# Patient Record
Sex: MALE | Race: ASIAN | NOT HISPANIC OR LATINO | URBAN - METROPOLITAN AREA
[De-identification: names, ages, dates, MRNs, and addresses within clinical notes are randomized per-mention and may not be internally consistent; named-entity substitution may affect disease eponyms.]

---

## 2021-11-15 ENCOUNTER — EMERGENCY (EMERGENCY)
Facility: HOSPITAL | Age: 61
LOS: 1 days | Discharge: HOME | End: 2021-11-15
Attending: EMERGENCY MEDICINE
Payer: MEDICAID

## 2021-11-15 VITALS
SYSTOLIC BLOOD PRESSURE: 164 MMHG | TEMPERATURE: 98 F | HEIGHT: 68 IN | RESPIRATION RATE: 16 BRPM | HEART RATE: 78 BPM | DIASTOLIC BLOOD PRESSURE: 96 MMHG | WEIGHT: 188.94 LBS | OXYGEN SATURATION: 98 %

## 2021-11-15 VITALS — SYSTOLIC BLOOD PRESSURE: 135 MMHG | DIASTOLIC BLOOD PRESSURE: 78 MMHG

## 2021-11-15 LAB
ALBUMIN SERPL ELPH-MCNC: 4 G/DL — SIGNIFICANT CHANGE UP (ref 3.5–5)
ALP SERPL-CCNC: 100 U/L — SIGNIFICANT CHANGE UP (ref 40–120)
ALT FLD-CCNC: 41 U/L DA — SIGNIFICANT CHANGE UP (ref 10–60)
ANION GAP SERPL CALC-SCNC: 6 MMOL/L — SIGNIFICANT CHANGE UP (ref 5–17)
APPEARANCE UR: CLEAR — SIGNIFICANT CHANGE UP
AST SERPL-CCNC: 14 U/L — SIGNIFICANT CHANGE UP (ref 10–40)
BACTERIA # UR AUTO: ABNORMAL /HPF
BASOPHILS # BLD AUTO: 0.06 K/UL — SIGNIFICANT CHANGE UP (ref 0–0.2)
BASOPHILS NFR BLD AUTO: 0.7 % — SIGNIFICANT CHANGE UP (ref 0–2)
BILIRUB SERPL-MCNC: 0.5 MG/DL — SIGNIFICANT CHANGE UP (ref 0.2–1.2)
BILIRUB UR-MCNC: NEGATIVE — SIGNIFICANT CHANGE UP
BUN SERPL-MCNC: 10 MG/DL — SIGNIFICANT CHANGE UP (ref 7–18)
CALCIUM SERPL-MCNC: 9.3 MG/DL — SIGNIFICANT CHANGE UP (ref 8.4–10.5)
CHLORIDE SERPL-SCNC: 103 MMOL/L — SIGNIFICANT CHANGE UP (ref 96–108)
CO2 SERPL-SCNC: 27 MMOL/L — SIGNIFICANT CHANGE UP (ref 22–31)
COLOR SPEC: YELLOW — SIGNIFICANT CHANGE UP
CREAT SERPL-MCNC: 0.85 MG/DL — SIGNIFICANT CHANGE UP (ref 0.5–1.3)
DIFF PNL FLD: ABNORMAL
EOSINOPHIL # BLD AUTO: 0.18 K/UL — SIGNIFICANT CHANGE UP (ref 0–0.5)
EOSINOPHIL NFR BLD AUTO: 2 % — SIGNIFICANT CHANGE UP (ref 0–6)
EPI CELLS # UR: SIGNIFICANT CHANGE UP /HPF
GLUCOSE SERPL-MCNC: 272 MG/DL — HIGH (ref 70–99)
GLUCOSE UR QL: 1000 MG/DL
HCT VFR BLD CALC: 46.2 % — SIGNIFICANT CHANGE UP (ref 39–50)
HGB BLD-MCNC: 15.7 G/DL — SIGNIFICANT CHANGE UP (ref 13–17)
IMM GRANULOCYTES NFR BLD AUTO: 1.3 % — SIGNIFICANT CHANGE UP (ref 0–1.5)
KETONES UR-MCNC: NEGATIVE — SIGNIFICANT CHANGE UP
LEUKOCYTE ESTERASE UR-ACNC: ABNORMAL
LIDOCAIN IGE QN: 220 U/L — SIGNIFICANT CHANGE UP (ref 73–393)
LYMPHOCYTES # BLD AUTO: 3.19 K/UL — SIGNIFICANT CHANGE UP (ref 1–3.3)
LYMPHOCYTES # BLD AUTO: 35.4 % — SIGNIFICANT CHANGE UP (ref 13–44)
MCHC RBC-ENTMCNC: 26.9 PG — LOW (ref 27–34)
MCHC RBC-ENTMCNC: 34 GM/DL — SIGNIFICANT CHANGE UP (ref 32–36)
MCV RBC AUTO: 79.2 FL — LOW (ref 80–100)
MONOCYTES # BLD AUTO: 0.71 K/UL — SIGNIFICANT CHANGE UP (ref 0–0.9)
MONOCYTES NFR BLD AUTO: 7.9 % — SIGNIFICANT CHANGE UP (ref 2–14)
NEUTROPHILS # BLD AUTO: 4.74 K/UL — SIGNIFICANT CHANGE UP (ref 1.8–7.4)
NEUTROPHILS NFR BLD AUTO: 52.7 % — SIGNIFICANT CHANGE UP (ref 43–77)
NITRITE UR-MCNC: NEGATIVE — SIGNIFICANT CHANGE UP
NRBC # BLD: 0 /100 WBCS — SIGNIFICANT CHANGE UP (ref 0–0)
PH UR: 6.5 — SIGNIFICANT CHANGE UP (ref 5–8)
PLATELET # BLD AUTO: 193 K/UL — SIGNIFICANT CHANGE UP (ref 150–400)
POTASSIUM SERPL-MCNC: 3.5 MMOL/L — SIGNIFICANT CHANGE UP (ref 3.5–5.3)
POTASSIUM SERPL-SCNC: 3.5 MMOL/L — SIGNIFICANT CHANGE UP (ref 3.5–5.3)
PROT SERPL-MCNC: 7.9 G/DL — SIGNIFICANT CHANGE UP (ref 6–8.3)
PROT UR-MCNC: 30 MG/DL
RBC # BLD: 5.83 M/UL — HIGH (ref 4.2–5.8)
RBC # FLD: 12.5 % — SIGNIFICANT CHANGE UP (ref 10.3–14.5)
RBC CASTS # UR COMP ASSIST: ABNORMAL /HPF (ref 0–2)
SODIUM SERPL-SCNC: 136 MMOL/L — SIGNIFICANT CHANGE UP (ref 135–145)
SP GR SPEC: 1.01 — SIGNIFICANT CHANGE UP (ref 1.01–1.02)
UROBILINOGEN FLD QL: NEGATIVE — SIGNIFICANT CHANGE UP
WBC # BLD: 9 K/UL — SIGNIFICANT CHANGE UP (ref 3.8–10.5)
WBC # FLD AUTO: 9 K/UL — SIGNIFICANT CHANGE UP (ref 3.8–10.5)
WBC UR QL: ABNORMAL /HPF (ref 0–5)

## 2021-11-15 PROCEDURE — 99285 EMERGENCY DEPT VISIT HI MDM: CPT

## 2021-11-15 PROCEDURE — 83690 ASSAY OF LIPASE: CPT

## 2021-11-15 PROCEDURE — 74176 CT ABD & PELVIS W/O CONTRAST: CPT | Mod: MA

## 2021-11-15 PROCEDURE — 85025 COMPLETE CBC W/AUTO DIFF WBC: CPT

## 2021-11-15 PROCEDURE — 87086 URINE CULTURE/COLONY COUNT: CPT

## 2021-11-15 PROCEDURE — 74176 CT ABD & PELVIS W/O CONTRAST: CPT | Mod: 26,MA

## 2021-11-15 PROCEDURE — 36415 COLL VENOUS BLD VENIPUNCTURE: CPT

## 2021-11-15 PROCEDURE — 96374 THER/PROPH/DIAG INJ IV PUSH: CPT

## 2021-11-15 PROCEDURE — 80053 COMPREHEN METABOLIC PANEL: CPT

## 2021-11-15 PROCEDURE — 99284 EMERGENCY DEPT VISIT MOD MDM: CPT | Mod: 25

## 2021-11-15 PROCEDURE — 81001 URINALYSIS AUTO W/SCOPE: CPT

## 2021-11-15 RX ORDER — KETOROLAC TROMETHAMINE 30 MG/ML
15 SYRINGE (ML) INJECTION ONCE
Refills: 0 | Status: DISCONTINUED | OUTPATIENT
Start: 2021-11-15 | End: 2021-11-15

## 2021-11-15 RX ORDER — TAMSULOSIN HYDROCHLORIDE 0.4 MG/1
1 CAPSULE ORAL
Qty: 7 | Refills: 0
Start: 2021-11-15 | End: 2021-11-21

## 2021-11-15 RX ORDER — METHOCARBAMOL 500 MG/1
1 TABLET, FILM COATED ORAL
Qty: 12 | Refills: 0
Start: 2021-11-15 | End: 2021-11-18

## 2021-11-15 RX ORDER — IBUPROFEN 200 MG
1 TABLET ORAL
Qty: 20 | Refills: 0
Start: 2021-11-15 | End: 2021-11-19

## 2021-11-15 RX ORDER — SODIUM CHLORIDE 9 MG/ML
1000 INJECTION INTRAMUSCULAR; INTRAVENOUS; SUBCUTANEOUS ONCE
Refills: 0 | Status: COMPLETED | OUTPATIENT
Start: 2021-11-15 | End: 2021-11-15

## 2021-11-15 RX ORDER — LIDOCAINE 4 G/100G
1 CREAM TOPICAL ONCE
Refills: 0 | Status: COMPLETED | OUTPATIENT
Start: 2021-11-15 | End: 2021-11-15

## 2021-11-15 RX ORDER — LIDOCAINE 4 G/100G
1 CREAM TOPICAL
Qty: 5 | Refills: 0
Start: 2021-11-15 | End: 2021-11-19

## 2021-11-15 RX ADMIN — LIDOCAINE 1 PATCH: 4 CREAM TOPICAL at 16:59

## 2021-11-15 RX ADMIN — Medication 15 MILLIGRAM(S): at 15:01

## 2021-11-15 RX ADMIN — SODIUM CHLORIDE 1000 MILLILITER(S): 9 INJECTION INTRAMUSCULAR; INTRAVENOUS; SUBCUTANEOUS at 15:01

## 2021-11-15 NOTE — ED PROVIDER NOTE - PATIENT PORTAL LINK FT
You can access the FollowMyHealth Patient Portal offered by [facility] by registering at the following website: [email or web address]. By joining [employer]’s FollowMyHealth portal, you will also be able to view your health information using other applications (apps) compatible with our system.

## 2021-11-15 NOTE — ED PROVIDER NOTE - PROGRESS NOTE DETAILS
On re-exam, feeling much better. Still has some R flank/lower back pain. Has R paraspinal L2-L5 tenderness to palpation. R side back pain most likely due to muscle strain. CT shows L sided 4 mm mid-ureteral stone. No hydronephrosis or fat stranding. UA unlikely UTI especially that recently finished Bactrim x 1 week. Will dc with Rx for meds and urology follow up this week. Pt is well appearing walking with steady gait, stable for discharge and follow up without fail with medical doctor. I had a detailed discussion with the patient and/or guardian regarding the historical points, exam findings, and any diagnostic results supporting the discharge diagnosis. Pt educated on care and need for follow up. Strict return instructions and red flag signs and symptoms discussed with patient. Questions answered. Pt shows understanding of discharge information and agrees to follow.

## 2021-11-15 NOTE — ED PROVIDER NOTE - NSFOLLOWUPINSTRUCTIONS_ED_ALL_ED_FT
Follow up with the urologist this week without fail.  If you experience any new or worsening symptoms or if you are concerned you can always come back to the emergency for a re-evaluation.  If there were any prescriptions given to you during the visit today take them as prescribed. If you have any questions you can ask the pharmacist.

## 2021-11-15 NOTE — ED PROVIDER NOTE - OBJECTIVE STATEMENT
61 year old male with PMHx of HTN, diabetes and renal stones and no significant PSHx presenting to the ED with complaints of flank pain x3 weeks. Patient reports having gradual onset of left flank pain since approximately three weeks ago which intermittently radiates to the LLQ. Patient reports right flank pain for two weeks which he describes as an intermittent sharp sensation which occasionally radiates to the RLQ. Patient endorses that he was evaluated at [facility] on 10/25, and reports that a CT scan was performed at the time which revealed a 7.0 mm stone on the left side. Patient states that he was given Bactrim, Flomax, and Percocet. Patient is now presenting to the ED as he is unable to control the pain anymore. Patient denies any fever, chills, vomiting, testicular pain, inability to urinate, and all other acute complaints. NKDA.

## 2021-11-15 NOTE — ED PROVIDER NOTE - CARE PLAN
1 Principal Discharge DX:	Renal colic on left side  Secondary Diagnosis:	Strain of thoracic spine, initial encounter

## 2021-11-15 NOTE — ED ADULT NURSE NOTE - OBJECTIVE STATEMENT
pt is a 62 y/o male w/ c/o rt  flank pain pt no signs of any  distress nor discomfort w/ VSS.denies any  blood in the urine and pt  no signs of any  distress nor discomfort.

## 2021-11-15 NOTE — ED PROVIDER NOTE - NSICDXPASTMEDICALHX_GEN_ALL_CORE_FT
PAST MEDICAL HISTORY:  No pertinent past medical history       DM (diabetes mellitus)     HTN (hypertension)     Kidney stones

## 2021-11-15 NOTE — ED ADULT NURSE NOTE - NSIMPLEMENTINTERV_GEN_ALL_ED
IMPROVE VTE Individual Risk Assessment          RISK                                                          Points  [ x ] Previous VTE                                                3  [  ] Thrombophilia                                             2  [  ] Lower limb paralysis                                   2        (unable to hold up >15 seconds)    [  ] Current Cancer                                             2         (within 6 months)  [x  ] Immobilization > 24 hrs                              1  [  ] ICU/CCU stay > 24 hours                             1  [  x] Age > 60                                                         1    IMPROVE VTE Score: 5    c/w heparin drip for DVT/PE
Implemented All Universal Safety Interventions:  [place] to call system. Call bell, personal items and telephone within reach. Instruct patient to call for assistance. Room bathroom lighting operational. Non-slip footwear when patient is off stretcher. Physically safe environment: no spills, clutter or unnecessary equipment. Stretcher in lowest position, wheels locked, appropriate side rails in place.

## 2021-11-15 NOTE — ED PROVIDER NOTE - ATTENDING CONTRIBUTION TO CARE
61 year old male presents to the ED with complaints of bilateral flank pain over the past few weeks. Patient nontoxic appearing, hypertensive in triage, and afebrile. No signs of acute abdomen. Will obtain CT scan to rule out renal stone, check labs and urine, give IV fluids and meds, and reassess

## 2021-11-15 NOTE — ED PROVIDER NOTE - CLINICAL SUMMARY MEDICAL DECISION MAKING FREE TEXT BOX
61 year old male presents to the ED with complaints of bilateral flank pain over the past few weeks. Patient nontoxic appearing, hypertensive in triage, and afebrile. No signs of acute abdomen. Will obtain CT scan to rule out renal stone, check labs and urine, give IV fluids and meds, and reassess. Patient states that he never followed up with urologist since discharge.

## 2021-11-16 LAB
CULTURE RESULTS: SIGNIFICANT CHANGE UP
SPECIMEN SOURCE: SIGNIFICANT CHANGE UP

## 2022-06-07 ENCOUNTER — EMERGENCY (EMERGENCY)
Facility: HOSPITAL | Age: 62
LOS: 1 days | Discharge: HOME | End: 2022-06-07
Attending: EMERGENCY MEDICINE
Payer: MEDICAID

## 2022-06-07 VITALS
TEMPERATURE: 98 F | WEIGHT: 195.99 LBS | HEIGHT: 68 IN | HEART RATE: 79 BPM | RESPIRATION RATE: 18 BRPM | OXYGEN SATURATION: 97 % | DIASTOLIC BLOOD PRESSURE: 91 MMHG | SYSTOLIC BLOOD PRESSURE: 187 MMHG

## 2022-06-07 VITALS — DIASTOLIC BLOOD PRESSURE: 94 MMHG | SYSTOLIC BLOOD PRESSURE: 169 MMHG | HEART RATE: 64 BPM

## 2022-06-07 PROBLEM — I10 ESSENTIAL (PRIMARY) HYPERTENSION: Chronic | Status: ACTIVE | Noted: 2021-11-15

## 2022-06-07 LAB
ALBUMIN SERPL ELPH-MCNC: 3.2 G/DL — LOW (ref 3.5–5)
ALP SERPL-CCNC: 90 U/L — SIGNIFICANT CHANGE UP (ref 40–120)
ALT FLD-CCNC: 29 U/L DA — SIGNIFICANT CHANGE UP (ref 10–60)
ANION GAP SERPL CALC-SCNC: 7 MMOL/L — SIGNIFICANT CHANGE UP (ref 5–17)
APPEARANCE UR: CLEAR — SIGNIFICANT CHANGE UP
AST SERPL-CCNC: 10 U/L — SIGNIFICANT CHANGE UP (ref 10–40)
BASOPHILS # BLD AUTO: 0.07 K/UL — SIGNIFICANT CHANGE UP (ref 0–0.2)
BASOPHILS NFR BLD AUTO: 0.8 % — SIGNIFICANT CHANGE UP (ref 0–2)
BILIRUB SERPL-MCNC: 0.5 MG/DL — SIGNIFICANT CHANGE UP (ref 0.2–1.2)
BILIRUB UR-MCNC: NEGATIVE — SIGNIFICANT CHANGE UP
BUN SERPL-MCNC: 15 MG/DL — SIGNIFICANT CHANGE UP (ref 7–18)
CALCIUM SERPL-MCNC: 9.1 MG/DL — SIGNIFICANT CHANGE UP (ref 8.4–10.5)
CHLORIDE SERPL-SCNC: 104 MMOL/L — SIGNIFICANT CHANGE UP (ref 96–108)
CO2 SERPL-SCNC: 27 MMOL/L — SIGNIFICANT CHANGE UP (ref 22–31)
COLOR SPEC: YELLOW — SIGNIFICANT CHANGE UP
CREAT SERPL-MCNC: 1.03 MG/DL — SIGNIFICANT CHANGE UP (ref 0.5–1.3)
DIFF PNL FLD: ABNORMAL
EGFR: 83 ML/MIN/1.73M2 — SIGNIFICANT CHANGE UP
EOSINOPHIL # BLD AUTO: 0.25 K/UL — SIGNIFICANT CHANGE UP (ref 0–0.5)
EOSINOPHIL NFR BLD AUTO: 2.8 % — SIGNIFICANT CHANGE UP (ref 0–6)
GLUCOSE SERPL-MCNC: 293 MG/DL — HIGH (ref 70–99)
GLUCOSE UR QL: 1000 MG/DL
HCT VFR BLD CALC: 41.6 % — SIGNIFICANT CHANGE UP (ref 39–50)
HGB BLD-MCNC: 14.2 G/DL — SIGNIFICANT CHANGE UP (ref 13–17)
IMM GRANULOCYTES NFR BLD AUTO: 0.7 % — SIGNIFICANT CHANGE UP (ref 0–1.5)
KETONES UR-MCNC: NEGATIVE — SIGNIFICANT CHANGE UP
LEUKOCYTE ESTERASE UR-ACNC: NEGATIVE — SIGNIFICANT CHANGE UP
LIDOCAIN IGE QN: 188 U/L — SIGNIFICANT CHANGE UP (ref 73–393)
LYMPHOCYTES # BLD AUTO: 3.11 K/UL — SIGNIFICANT CHANGE UP (ref 1–3.3)
LYMPHOCYTES # BLD AUTO: 35.1 % — SIGNIFICANT CHANGE UP (ref 13–44)
MAGNESIUM SERPL-MCNC: 1.9 MG/DL — SIGNIFICANT CHANGE UP (ref 1.6–2.6)
MCHC RBC-ENTMCNC: 26.6 PG — LOW (ref 27–34)
MCHC RBC-ENTMCNC: 34.1 GM/DL — SIGNIFICANT CHANGE UP (ref 32–36)
MCV RBC AUTO: 78 FL — LOW (ref 80–100)
MONOCYTES # BLD AUTO: 0.75 K/UL — SIGNIFICANT CHANGE UP (ref 0–0.9)
MONOCYTES NFR BLD AUTO: 8.5 % — SIGNIFICANT CHANGE UP (ref 2–14)
NEUTROPHILS # BLD AUTO: 4.61 K/UL — SIGNIFICANT CHANGE UP (ref 1.8–7.4)
NEUTROPHILS NFR BLD AUTO: 52.1 % — SIGNIFICANT CHANGE UP (ref 43–77)
NITRITE UR-MCNC: NEGATIVE — SIGNIFICANT CHANGE UP
NRBC # BLD: 0 /100 WBCS — SIGNIFICANT CHANGE UP (ref 0–0)
PH UR: 6 — SIGNIFICANT CHANGE UP (ref 5–8)
PLATELET # BLD AUTO: 179 K/UL — SIGNIFICANT CHANGE UP (ref 150–400)
POTASSIUM SERPL-MCNC: 3.4 MMOL/L — LOW (ref 3.5–5.3)
POTASSIUM SERPL-SCNC: 3.4 MMOL/L — LOW (ref 3.5–5.3)
PROT SERPL-MCNC: 7.1 G/DL — SIGNIFICANT CHANGE UP (ref 6–8.3)
PROT UR-MCNC: 30 MG/DL
RBC # BLD: 5.33 M/UL — SIGNIFICANT CHANGE UP (ref 4.2–5.8)
RBC # FLD: 13 % — SIGNIFICANT CHANGE UP (ref 10.3–14.5)
SODIUM SERPL-SCNC: 138 MMOL/L — SIGNIFICANT CHANGE UP (ref 135–145)
SP GR SPEC: 1.01 — SIGNIFICANT CHANGE UP (ref 1.01–1.02)
TROPONIN I, HIGH SENSITIVITY RESULT: 13.2 NG/L — SIGNIFICANT CHANGE UP
UROBILINOGEN FLD QL: NEGATIVE — SIGNIFICANT CHANGE UP
WBC # BLD: 8.85 K/UL — SIGNIFICANT CHANGE UP (ref 3.8–10.5)
WBC # FLD AUTO: 8.85 K/UL — SIGNIFICANT CHANGE UP (ref 3.8–10.5)

## 2022-06-07 PROCEDURE — 87086 URINE CULTURE/COLONY COUNT: CPT

## 2022-06-07 PROCEDURE — 71046 X-RAY EXAM CHEST 2 VIEWS: CPT

## 2022-06-07 PROCEDURE — 93005 ELECTROCARDIOGRAM TRACING: CPT

## 2022-06-07 PROCEDURE — 83690 ASSAY OF LIPASE: CPT

## 2022-06-07 PROCEDURE — 99285 EMERGENCY DEPT VISIT HI MDM: CPT | Mod: 25

## 2022-06-07 PROCEDURE — 74176 CT ABD & PELVIS W/O CONTRAST: CPT | Mod: MA

## 2022-06-07 PROCEDURE — 99285 EMERGENCY DEPT VISIT HI MDM: CPT

## 2022-06-07 PROCEDURE — 81001 URINALYSIS AUTO W/SCOPE: CPT

## 2022-06-07 PROCEDURE — 96374 THER/PROPH/DIAG INJ IV PUSH: CPT

## 2022-06-07 PROCEDURE — 84484 ASSAY OF TROPONIN QUANT: CPT

## 2022-06-07 PROCEDURE — 74176 CT ABD & PELVIS W/O CONTRAST: CPT | Mod: 26,MA

## 2022-06-07 PROCEDURE — 85025 COMPLETE CBC W/AUTO DIFF WBC: CPT

## 2022-06-07 PROCEDURE — 83735 ASSAY OF MAGNESIUM: CPT

## 2022-06-07 PROCEDURE — 80053 COMPREHEN METABOLIC PANEL: CPT

## 2022-06-07 PROCEDURE — 71046 X-RAY EXAM CHEST 2 VIEWS: CPT | Mod: 26

## 2022-06-07 PROCEDURE — 36415 COLL VENOUS BLD VENIPUNCTURE: CPT

## 2022-06-07 RX ORDER — KETOROLAC TROMETHAMINE 30 MG/ML
15 SYRINGE (ML) INJECTION ONCE
Refills: 0 | Status: DISCONTINUED | OUTPATIENT
Start: 2022-06-07 | End: 2022-06-07

## 2022-06-07 RX ORDER — TAMSULOSIN HYDROCHLORIDE 0.4 MG/1
1 CAPSULE ORAL
Qty: 14 | Refills: 0
Start: 2022-06-07 | End: 2022-06-20

## 2022-06-07 RX ORDER — KETOROLAC TROMETHAMINE 30 MG/ML
1 SYRINGE (ML) INJECTION
Qty: 20 | Refills: 0
Start: 2022-06-07

## 2022-06-07 RX ORDER — SODIUM CHLORIDE 9 MG/ML
1000 INJECTION INTRAMUSCULAR; INTRAVENOUS; SUBCUTANEOUS ONCE
Refills: 0 | Status: COMPLETED | OUTPATIENT
Start: 2022-06-07 | End: 2022-06-07

## 2022-06-07 RX ADMIN — SODIUM CHLORIDE 1000 MILLILITER(S): 9 INJECTION INTRAMUSCULAR; INTRAVENOUS; SUBCUTANEOUS at 14:02

## 2022-06-07 RX ADMIN — Medication 15 MILLIGRAM(S): at 14:01

## 2022-06-07 NOTE — ED ADULT NURSE NOTE - OBJECTIVE STATEMENT
Pt. c/o left flank pain that started a week ago. Pt. denies n/v/d and  symptoms. Pt. is hypertensive at triage but states "my blood pressure is always high".

## 2022-06-07 NOTE — ED PROVIDER NOTE - OBJECTIVE STATEMENT
62yoM with h/o HTN, DM, kidney stone s/p laser in Feb, h/o cervical and lumbar disc problems with baseline L finger numbness and L leg weakness 2/2 this, presents with L sided flank pain x 1 wk, that radiates sometimes to the R flank and sometimes very lightly to the L front abdomen, intermittent, nonexertional, improved with Tyl and naproxen. Denies all other symptoms including CP, SOB, cough, urinary symptoms, increased weakness/numbness of extrems, v/d, fever.

## 2022-06-07 NOTE — ED PROVIDER NOTE - CLINICAL SUMMARY MEDICAL DECISION MAKING FREE TEXT BOX
Character and appearance very low suspicion for dissection and no murmur or pulse asymmetry. Character low suspicion for ACS or PE and ECG/trop _____________. Character and appearance very low suspicion for dissection and no murmur or pulse asymmetry. Character low suspicion for ACS or PE and ECG/trop low suspicion for this. Character c/w pt's past h/o kidney stones, confirmed on CT. No RHONA or UTI. Given fluids, Toradol with significant improvement. Patient is well appearing, NAD, afebrile, hemodynamically stable. Any available tests and studies were discussed with patient. Discharged with instructions in further symptomatic care, return precautions, and need for uro f/u.

## 2022-06-07 NOTE — ED PROVIDER NOTE - NSFOLLOWUPINSTRUCTIONS_ED_ALL_ED_FT
Please follow up with a urologist in 1-2 days.  Please drink a lot of fluids to keep hydrated.  Please use ketorolac as needed for pain.  Please use the tamsulosin as prescribed.  Please return to the emergency department if you have worsening pain, vomiting, fever, or any other symptoms.      Kidney Stones    WHAT YOU NEED TO KNOW:    Kidney stones form in the urinary system when the water and waste in your urine are out of balance. When this happens, certain types of waste crystals separate from the urine. The crystals build up and form kidney stones. You may have more than one kidney stone.    DISCHARGE INSTRUCTIONS:    Return to the emergency department if:   •You are vomiting and it is not relieved with medicine.    Call your doctor or kidney specialist if:   •You have a fever.  •You have trouble urinating.  •You see blood in your urine.  •You have severe pain.  •You have any questions or concerns about your condition or care.    Medicines: You may need any of the following:  •NSAIDs, such as ibuprofen, help decrease swelling, pain, and fever. This medicine is available with or without a doctor's order. NSAIDs can cause stomach bleeding or kidney problems in certain people. If you take blood thinner medicine, always ask your healthcare provider if NSAIDs are safe for you. Always read the medicine label and follow directions.  •Acetaminophen decreases pain and fever. It is available without a doctor's order. Ask how much to take and how often to take it. Follow directions. Read the labels of all other medicines you are using to see if they also contain acetaminophen, or ask your doctor or pharmacist. Acetaminophen can cause liver damage if not taken correctly. Do not use more than 4 grams (4,000 milligrams) total of acetaminophen in one day.   •Prescription pain medicine may be given. Ask your healthcare provider how to take this medicine safely. Some prescription pain medicines contain acetaminophen. Do not take other medicines that contain acetaminophen without talking to your healthcare provider. Too much acetaminophen may cause liver damage. Prescription pain medicine may cause constipation. Ask your healthcare provider how to prevent or treat constipation.   •Medicines to balance your electrolytes may be needed.  •Take your medicine as directed. Contact your healthcare provider if you think your medicine is not helping or if you have side effects. Tell him or her if you are allergic to any medicine. Keep a list of the medicines, vitamins, and herbs you take. Include the amounts, and when and why you take them. Bring the list or the pill bottles to follow-up visits. Carry your medicine list with you in case of an emergency.    What you can do to manage kidney stones:   •Drink more liquids. Your healthcare provider may tell you to drink at least 8 to 12 (eight-ounce) cups of liquids each day. This helps flush out the kidney stones when you urinate. Water is the best liquid to drink.  •Strain your urine every time you go to the bathroom. Urinate through a strainer or a piece of thin cloth to catch the stones. Take the stones to your healthcare provider so they can be sent to the lab for tests. This will help your healthcare providers plan the best treatment for you.  •Eat a variety of healthy foods. Healthy foods include fruits, vegetables, whole-grain breads, low-fat dairy products, beans, and fish. You may need to limit how much sodium (salt) or protein you eat. Ask for information about the best foods for you.  •Be physically active as directed. Your stones may pass more easily if you stay active. Physical activity can also help you manage your weight. Ask about the best activities for you.    After you pass the kidney stones: Your healthcare provider may order a 24-hour urine test. Results from a 24-hour urine test will help your healthcare provider plan ways to prevent more stones from forming. Your healthcare provider will give you more instructions.    Follow up with your doctor or kidney specialist as directed: Write down your questions so you remember to ask them during your visits.

## 2022-06-07 NOTE — ED PROVIDER NOTE - NSICDXPASTMEDICALHX_GEN_ALL_CORE_FT
PAST MEDICAL HISTORY:  DM (diabetes mellitus)     HTN (hypertension)     Kidney stones     No pertinent past medical history

## 2022-06-07 NOTE — ED PROVIDER NOTE - CARE PROVIDER_API CALL
Cristino Kennedy (MD)  Urology  [address]  [place] [address]  Phone: [phone]  Fax: [phone]  Follow Up Time: 1-3 Days

## 2022-06-07 NOTE — ED PROVIDER NOTE - PATIENT PORTAL LINK FT
You can access the FollowMyHealth Patient Portal offered by [facility] by registering at the following website: [email or web address]. By joining [employer]’s FollowMyHealth portal, you will also be able to view your health information using other applications (apps) compatible with our system.

## 2022-06-07 NOTE — ED PROVIDER NOTE - PHYSICAL EXAMINATION
Afebrile, hemodynamically stable, saturating well  NAD, well appearing, sitting comfortably in bed, no WOB, speaking full sentences  Head NCAT  EOMI grossly, anicteric  MMM  No JVD  RRR, nml S1/S2, no m/r/g  Lungs CTAB, no w/r/r  Abd soft, NT, ND, nml BS, no rebound or guarding, no CVAT  AAO, CN's 3-12 intact, reports decreased sensation to L arm (baseline) though not to leg  SCOTT spontaneously, no leg cyanosis or edema, 2+ DP and radial pulses  Skin warm, dry, no rashes or hives

## 2022-06-08 LAB
CULTURE RESULTS: SIGNIFICANT CHANGE UP
SPECIMEN SOURCE: SIGNIFICANT CHANGE UP

## 2022-06-17 ENCOUNTER — APPOINTMENT (OUTPATIENT)
Dept: UROLOGY | Facility: CLINIC | Age: 62
End: 2022-06-17

## 2022-06-21 ENCOUNTER — APPOINTMENT (OUTPATIENT)
Dept: UROLOGY | Facility: CLINIC | Age: 62
End: 2022-06-21
Payer: COMMERCIAL

## 2022-06-21 VITALS
HEIGHT: 68 IN | DIASTOLIC BLOOD PRESSURE: 69 MMHG | SYSTOLIC BLOOD PRESSURE: 161 MMHG | TEMPERATURE: 97.1 F | HEART RATE: 96 BPM | WEIGHT: 203 LBS | RESPIRATION RATE: 16 BRPM | OXYGEN SATURATION: 96 % | BODY MASS INDEX: 30.77 KG/M2

## 2022-06-21 DIAGNOSIS — Z78.9 OTHER SPECIFIED HEALTH STATUS: ICD-10-CM

## 2022-06-21 PROCEDURE — 99072 ADDL SUPL MATRL&STAF TM PHE: CPT

## 2022-06-21 PROCEDURE — 99204 OFFICE O/P NEW MOD 45 MIN: CPT

## 2022-06-22 NOTE — ADDENDUM
[FreeTextEntry1] : Entered by Candelaria Guerra, acting as scribe for Dr. Mario Hooker.\par \par The documentation recorded by the scribe accurately reflects the service I personally performed and the decisions made by me.

## 2022-06-22 NOTE — LETTER BODY
[FreeTextEntry1] : Alanis Rowland MD \par [address] [facility], \[place] [address]\par [phone]\par \par Dear Dr. Rowland, \par \par Reason for Visit: Left ureteral stone. Renal colic\par \par This is a 62 year-old gentleman with left renal colic and left ureteral stone. The patient is referred for evaluation of his condition. He previously underwent left ureteroscopy in February 2022. The patient recently went to the emergency department for left renal colic. His hospital CT scan demonstrated a 4 mm left ureteral stone and moderate left hydroureteronephrosis. The patient denies any gross hematuria or dysuria or urinary incontinence. The patient denies any aggravating or relieving factors. The patient denies any interference of function. All other review of systems are negative. Past medical history, family history and social history were inquired and were noncontributory to current condition. The patient does not use tobacco or drink alcohol. Medications and allergies were reviewed. He has no known allergies to medication. \par \par On examination, the patient is in no acute distress. He is alert and oriented follows commands. He has normal mood and affect. He is normocephalic. Oral no thrush. Neck is supple. Respirations are unlabored. His abdomen is soft and nontender. Liver is nonpalpable. Bladder is nonpalpable. No CVA tenderness. Neurologically he is grossly intact. No peripheral edema. Skin without gross abnormality.  \par \par I personally reviewed CT images with the patient today and images demonstrated a 4 mm left ureteral stone and moderate left hydroureteronephrosis. \par \par Assessment: Left renal colic. Left ureteral stone. \par \par I counseled the patient. I discussed the various etiologies of his symptoms. I recommended he patient undergo repeat left ureteroscopy, laser lithotripsy, stone extraction, and stent placement. I counseled the patient regarding the procedure. The risks and benefits were discussed. Alternatives were given. I answered the patient questions. The patient will take the necessary preparations for the procedure. He will obtain preoperative labs including BMP, CBC w/ DIFF, culture and COVID testing. I also recommended the patient begin a trial of Flomax as stone expulsion therapy. He will take Percocet PRN for pain. I discussed the potential side effects of the medication. I counseled the patient on its use and side effects. If the patient develops any side effects, the patient will discontinue the medication and contact me. The patient will follow-up as directed and will contact me with any questions or concerns. Thank you for the opportunity to participate in the care of this patient. I'll keep you updated on his progress.\par \par Plan: Left ureteroscopy, laser lithotripsy, stone extraction, and stent placement. Preoperative labs. Trial of Flomax. Percocet PRN. Follow up as directed. \par \par

## 2022-06-23 LAB
ANION GAP SERPL CALC-SCNC: 13 MMOL/L
BACTERIA UR CULT: NORMAL
BASOPHILS # BLD AUTO: 0.05 K/UL
BASOPHILS NFR BLD AUTO: 0.5 %
BUN SERPL-MCNC: 9 MG/DL
CALCIUM SERPL-MCNC: 8.9 MG/DL
CHLORIDE SERPL-SCNC: 99 MMOL/L
CO2 SERPL-SCNC: 23 MMOL/L
CREAT SERPL-MCNC: 1.11 MG/DL
EGFR: 76 ML/MIN/1.73M2
EOSINOPHIL # BLD AUTO: 0.27 K/UL
EOSINOPHIL NFR BLD AUTO: 3 %
GLUCOSE SERPL-MCNC: 373 MG/DL
HCT VFR BLD CALC: 41.3 %
HGB BLD-MCNC: 13.8 G/DL
IMM GRANULOCYTES NFR BLD AUTO: 0.5 %
LYMPHOCYTES # BLD AUTO: 2.47 K/UL
LYMPHOCYTES NFR BLD AUTO: 27.1 %
MAN DIFF?: NORMAL
MCHC RBC-ENTMCNC: 27.5 PG
MCHC RBC-ENTMCNC: 33.4 GM/DL
MCV RBC AUTO: 82.4 FL
MONOCYTES # BLD AUTO: 0.64 K/UL
MONOCYTES NFR BLD AUTO: 7 %
NEUTROPHILS # BLD AUTO: 5.64 K/UL
NEUTROPHILS NFR BLD AUTO: 61.9 %
PLATELET # BLD AUTO: 189 K/UL
POTASSIUM SERPL-SCNC: 3.4 MMOL/L
RBC # BLD: 5.01 M/UL
RBC # FLD: 13.2 %
SODIUM SERPL-SCNC: 135 MMOL/L
WBC # FLD AUTO: 9.12 K/UL

## 2022-06-29 ENCOUNTER — OUTPATIENT (OUTPATIENT)
Dept: OUTPATIENT SERVICES | Facility: HOSPITAL | Age: 62
LOS: 1 days | End: 2022-06-29
Payer: MEDICAID

## 2022-06-29 VITALS
RESPIRATION RATE: 18 BRPM | HEART RATE: 78 BPM | WEIGHT: 197.98 LBS | TEMPERATURE: 98 F | OXYGEN SATURATION: 98 % | DIASTOLIC BLOOD PRESSURE: 75 MMHG | HEIGHT: 68 IN | SYSTOLIC BLOOD PRESSURE: 160 MMHG

## 2022-06-29 DIAGNOSIS — Z01.818 ENCOUNTER FOR OTHER PREPROCEDURAL EXAMINATION: ICD-10-CM

## 2022-06-29 DIAGNOSIS — N20.1 CALCULUS OF URETER: ICD-10-CM

## 2022-06-29 DIAGNOSIS — E11.9 TYPE 2 DIABETES MELLITUS WITHOUT COMPLICATIONS: ICD-10-CM

## 2022-06-29 DIAGNOSIS — I10 ESSENTIAL (PRIMARY) HYPERTENSION: ICD-10-CM

## 2022-06-29 DIAGNOSIS — Z98.890 OTHER SPECIFIED POSTPROCEDURAL STATES: Chronic | ICD-10-CM

## 2022-06-29 DIAGNOSIS — Z00.00 ENCOUNTER FOR GENERAL ADULT MEDICAL EXAMINATION WITHOUT ABNORMAL FINDINGS: ICD-10-CM

## 2022-06-29 PROCEDURE — G0463: CPT

## 2022-06-29 PROCEDURE — 87086 URINE CULTURE/COLONY COUNT: CPT

## 2022-06-29 PROCEDURE — 83036 HEMOGLOBIN GLYCOSYLATED A1C: CPT

## 2022-06-29 NOTE — H&P PST ADULT - FALL HARM RISK - UNIVERSAL INTERVENTIONS
Bed in lowest position, wheels locked, appropriate side rails in place/Call bell, personal items and telephone in reach/Instruct patient to call for assistance before getting out of bed or chair/Non-slip footwear when patient is out of bed/[place] to call system/Physically safe environment - no spills, clutter or unnecessary equipment/Purposeful Proactive Rounding/Room/bathroom lighting operational, light cord in reach

## 2022-06-29 NOTE — H&P PST ADULT - ATTENDING COMMENTS
Patient with left ureteral calculus. Patient wishes to proceed with left ureteroscopy, laser lithotripsy, stone extraction, stent placement. Informed consent was obtained. Alternatives were given. Risks including but not limited to bleeding, infection, risk of anesthesia, pain, failure to cure, negative ureteroscopy, stone migration, need for future procedure, and injury to surrounding structures were discussed. Patient wishes to proceed with procedure. Patient with left ureteral calculus.  Patient wishes to proceed with left ureteroscopy, laser lithotripsy, stone extraction, stent placement. Informed consent was obtained. Alternatives were given. Risks including but not limited to bleeding, infection, risk of anesthesia, pain, failure to cure, negative ureteroscopy, stone migration, need for future procedure, and injury to surrounding structures were discussed. Patient wishes to proceed with procedure.

## 2022-06-29 NOTE — H&P PST ADULT - HISTORY OF PRESENT ILLNESS
62yr old male with hx of kidney stones. Pt states pain started on left side 10/2021 went to ED found to have 7mm stone. Laser lithotripsy was done 2/2022 without stent placement. Pain reoccurred on left side CT scan showed stone. Now coming in for  Left ureteroscopy laser lithotripsy stone extraction stent placement. Hx sig for HTN Diabetes HLD obesity BMI 30.1. Pt getting medical and cardio eval.    Note; covid test 7/13/2022 [place]

## 2022-06-29 NOTE — H&P PST ADULT - NSICDXPASTMEDICALHX_GEN_ALL_CORE_FT
PAST MEDICAL HISTORY:  COVID-19 12/2020 Fever 14days chills bodyaches no hosp mild asthma    DM (diabetes mellitus) 2007    History of hepatitis A 6/2020    HTN (hypertension)     Hyperlipidemia     Hypothyroidism     Kidney stones 2004 right 2021 left    Obesity (BMI 30.0-34.9)

## 2022-07-01 PROBLEM — E66.9 OBESITY, UNSPECIFIED: Chronic | Status: ACTIVE | Noted: 2022-06-29

## 2022-07-01 PROBLEM — N20.0 CALCULUS OF KIDNEY: Chronic | Status: ACTIVE | Noted: 2021-11-15

## 2022-07-01 PROBLEM — E78.5 HYPERLIPIDEMIA, UNSPECIFIED: Chronic | Status: ACTIVE | Noted: 2022-06-29

## 2022-07-01 PROBLEM — E03.9 HYPOTHYROIDISM, UNSPECIFIED: Chronic | Status: ACTIVE | Noted: 2022-06-29

## 2022-07-01 RX ORDER — OXYCODONE AND ACETAMINOPHEN 5; 325 MG/1; MG/1
5-325 TABLET ORAL
Qty: 9 | Refills: 0 | Status: ACTIVE | COMMUNITY
Start: 2022-06-21 | End: 1900-01-01

## 2022-07-03 ENCOUNTER — OUTPATIENT (OUTPATIENT)
Dept: OUTPATIENT SERVICES | Facility: HOSPITAL | Age: 62
LOS: 1 days | End: 2022-07-03
Payer: MEDICAID

## 2022-07-03 DIAGNOSIS — Z11.52 ENCOUNTER FOR SCREENING FOR COVID-19: ICD-10-CM

## 2022-07-03 DIAGNOSIS — Z98.890 OTHER SPECIFIED POSTPROCEDURAL STATES: Chronic | ICD-10-CM

## 2022-07-03 LAB — SARS-COV-2 RNA SPEC QL NAA+PROBE: SIGNIFICANT CHANGE UP

## 2022-07-03 PROCEDURE — C9803: CPT

## 2022-07-03 PROCEDURE — U0005: CPT

## 2022-07-03 PROCEDURE — U0003: CPT

## 2022-07-17 ENCOUNTER — OUTPATIENT (OUTPATIENT)
Dept: OUTPATIENT SERVICES | Facility: HOSPITAL | Age: 62
LOS: 1 days | End: 2022-07-17
Payer: MEDICAID

## 2022-07-17 DIAGNOSIS — Z11.52 ENCOUNTER FOR SCREENING FOR COVID-19: ICD-10-CM

## 2022-07-17 DIAGNOSIS — Z98.890 OTHER SPECIFIED POSTPROCEDURAL STATES: Chronic | ICD-10-CM

## 2022-07-17 LAB — SARS-COV-2 RNA SPEC QL NAA+PROBE: SIGNIFICANT CHANGE UP

## 2022-07-19 ENCOUNTER — TRANSCRIPTION ENCOUNTER (OUTPATIENT)
Age: 62
End: 2022-07-19

## 2022-07-20 ENCOUNTER — APPOINTMENT (OUTPATIENT)
Dept: UROLOGY | Facility: HOSPITAL | Age: 62
End: 2022-07-20

## 2022-07-20 ENCOUNTER — OUTPATIENT (OUTPATIENT)
Dept: OUTPATIENT SERVICES | Facility: HOSPITAL | Age: 62
LOS: 1 days | End: 2022-07-20
Payer: MEDICAID

## 2022-07-20 ENCOUNTER — TRANSCRIPTION ENCOUNTER (OUTPATIENT)
Age: 62
End: 2022-07-20

## 2022-07-20 VITALS
HEIGHT: 68 IN | HEART RATE: 76 BPM | WEIGHT: 197.98 LBS | DIASTOLIC BLOOD PRESSURE: 88 MMHG | TEMPERATURE: 99 F | OXYGEN SATURATION: 98 % | SYSTOLIC BLOOD PRESSURE: 159 MMHG | RESPIRATION RATE: 16 BRPM

## 2022-07-20 VITALS
OXYGEN SATURATION: 98 % | HEART RATE: 82 BPM | RESPIRATION RATE: 16 BRPM | TEMPERATURE: 98 F | DIASTOLIC BLOOD PRESSURE: 88 MMHG | SYSTOLIC BLOOD PRESSURE: 153 MMHG

## 2022-07-20 DIAGNOSIS — N20.1 CALCULUS OF URETER: ICD-10-CM

## 2022-07-20 DIAGNOSIS — Z00.00 ENCOUNTER FOR GENERAL ADULT MEDICAL EXAMINATION WITHOUT ABNORMAL FINDINGS: ICD-10-CM

## 2022-07-20 DIAGNOSIS — Z98.890 OTHER SPECIFIED POSTPROCEDURAL STATES: Chronic | ICD-10-CM

## 2022-07-20 LAB
ANION GAP SERPL CALC-SCNC: 12 MMOL/L — SIGNIFICANT CHANGE UP (ref 5–17)
ANION GAP SERPL CALC-SCNC: 13 MMOL/L — SIGNIFICANT CHANGE UP (ref 5–17)
APPEARANCE UR: CLEAR — SIGNIFICANT CHANGE UP
B-OH-BUTYR SERPL-SCNC: 0.1 MMOL/L — SIGNIFICANT CHANGE UP
BASE EXCESS BLDV CALC-SCNC: 1 MMOL/L — SIGNIFICANT CHANGE UP (ref -2–2)
BILIRUB UR-MCNC: NEGATIVE — SIGNIFICANT CHANGE UP
BUN SERPL-MCNC: 10 MG/DL — SIGNIFICANT CHANGE UP (ref 7–23)
BUN SERPL-MCNC: 11 MG/DL — SIGNIFICANT CHANGE UP (ref 7–23)
CA-I SERPL-SCNC: 1.2 MMOL/L — SIGNIFICANT CHANGE UP (ref 1.15–1.33)
CALCIUM SERPL-MCNC: 8.4 MG/DL — SIGNIFICANT CHANGE UP (ref 8.4–10.5)
CALCIUM SERPL-MCNC: 8.9 MG/DL — SIGNIFICANT CHANGE UP (ref 8.4–10.5)
CHLORIDE BLDV-SCNC: 102 MMOL/L — SIGNIFICANT CHANGE UP (ref 96–108)
CHLORIDE SERPL-SCNC: 102 MMOL/L — SIGNIFICANT CHANGE UP (ref 96–108)
CHLORIDE SERPL-SCNC: 103 MMOL/L — SIGNIFICANT CHANGE UP (ref 96–108)
CO2 BLDV-SCNC: 27 MMOL/L — HIGH (ref 22–26)
CO2 SERPL-SCNC: 24 MMOL/L — SIGNIFICANT CHANGE UP (ref 22–31)
CO2 SERPL-SCNC: 26 MMOL/L — SIGNIFICANT CHANGE UP (ref 22–31)
COLOR SPEC: YELLOW — SIGNIFICANT CHANGE UP
CREAT SERPL-MCNC: 0.79 MG/DL — SIGNIFICANT CHANGE UP (ref 0.5–1.3)
CREAT SERPL-MCNC: 0.82 MG/DL — SIGNIFICANT CHANGE UP (ref 0.5–1.3)
DIFF PNL FLD: NEGATIVE — SIGNIFICANT CHANGE UP
EGFR: 101 ML/MIN/1.73M2 — SIGNIFICANT CHANGE UP
EGFR: 102 ML/MIN/1.73M2 — SIGNIFICANT CHANGE UP
GAS PNL BLDV: 136 MMOL/L — SIGNIFICANT CHANGE UP (ref 136–145)
GAS PNL BLDV: SIGNIFICANT CHANGE UP
GAS PNL BLDV: SIGNIFICANT CHANGE UP
GLUCOSE BLDC GLUCOMTR-MCNC: 209 MG/DL — HIGH (ref 70–99)
GLUCOSE BLDC GLUCOMTR-MCNC: 228 MG/DL — HIGH (ref 70–99)
GLUCOSE BLDC GLUCOMTR-MCNC: 246 MG/DL — HIGH (ref 70–99)
GLUCOSE BLDC GLUCOMTR-MCNC: 249 MG/DL — HIGH (ref 70–99)
GLUCOSE BLDV-MCNC: 242 MG/DL — HIGH (ref 70–99)
GLUCOSE SERPL-MCNC: 228 MG/DL — HIGH (ref 70–99)
GLUCOSE SERPL-MCNC: 239 MG/DL — HIGH (ref 70–99)
GLUCOSE UR QL: ABNORMAL
HCO3 BLDV-SCNC: 26 MMOL/L — SIGNIFICANT CHANGE UP (ref 22–29)
HCT VFR BLDA CALC: 44 % — SIGNIFICANT CHANGE UP (ref 39–51)
HGB BLD CALC-MCNC: 14.6 G/DL — SIGNIFICANT CHANGE UP (ref 12.6–17.4)
KETONES UR-MCNC: NEGATIVE — SIGNIFICANT CHANGE UP
LACTATE BLDV-MCNC: 1.6 MMOL/L — SIGNIFICANT CHANGE UP (ref 0.7–2)
LEUKOCYTE ESTERASE UR-ACNC: NEGATIVE — SIGNIFICANT CHANGE UP
NITRITE UR-MCNC: NEGATIVE — SIGNIFICANT CHANGE UP
PCO2 BLDV: 42 MMHG — SIGNIFICANT CHANGE UP (ref 42–55)
PH BLDV: 7.4 — SIGNIFICANT CHANGE UP (ref 7.32–7.43)
PH UR: 6.5 — SIGNIFICANT CHANGE UP (ref 5–8)
PO2 BLDV: 64 MMHG — HIGH (ref 25–45)
POTASSIUM BLDV-SCNC: 3.1 MMOL/L — LOW (ref 3.5–5.1)
POTASSIUM SERPL-MCNC: 3.3 MMOL/L — LOW (ref 3.5–5.3)
POTASSIUM SERPL-MCNC: 3.4 MMOL/L — LOW (ref 3.5–5.3)
POTASSIUM SERPL-SCNC: 3.3 MMOL/L — LOW (ref 3.5–5.3)
POTASSIUM SERPL-SCNC: 3.4 MMOL/L — LOW (ref 3.5–5.3)
PROT UR-MCNC: 100 — SIGNIFICANT CHANGE UP
SAO2 % BLDV: 92.1 % — HIGH (ref 67–88)
SODIUM SERPL-SCNC: 139 MMOL/L — SIGNIFICANT CHANGE UP (ref 135–145)
SODIUM SERPL-SCNC: 141 MMOL/L — SIGNIFICANT CHANGE UP (ref 135–145)
SP GR SPEC: 1.02 — SIGNIFICANT CHANGE UP (ref 1.01–1.02)
UROBILINOGEN FLD QL: NEGATIVE — SIGNIFICANT CHANGE UP

## 2022-07-20 PROCEDURE — C1758: CPT

## 2022-07-20 PROCEDURE — C1769: CPT

## 2022-07-20 PROCEDURE — 76000 FLUOROSCOPY <1 HR PHYS/QHP: CPT

## 2022-07-20 PROCEDURE — C9399: CPT

## 2022-07-20 PROCEDURE — 84132 ASSAY OF SERUM POTASSIUM: CPT

## 2022-07-20 PROCEDURE — U0003: CPT

## 2022-07-20 PROCEDURE — 85014 HEMATOCRIT: CPT

## 2022-07-20 PROCEDURE — 52332 CYSTOSCOPY AND TREATMENT: CPT | Mod: LT

## 2022-07-20 PROCEDURE — 74420 UROGRAPHY RTRGR +-KUB: CPT | Mod: 26

## 2022-07-20 PROCEDURE — 82010 KETONE BODYS QUAN: CPT

## 2022-07-20 PROCEDURE — 83605 ASSAY OF LACTIC ACID: CPT

## 2022-07-20 PROCEDURE — C9803: CPT

## 2022-07-20 PROCEDURE — 80048 BASIC METABOLIC PNL TOTAL CA: CPT

## 2022-07-20 PROCEDURE — 82962 GLUCOSE BLOOD TEST: CPT

## 2022-07-20 PROCEDURE — 82330 ASSAY OF CALCIUM: CPT

## 2022-07-20 PROCEDURE — 82947 ASSAY GLUCOSE BLOOD QUANT: CPT

## 2022-07-20 PROCEDURE — 52351 CYSTOURETERO & OR PYELOSCOPE: CPT | Mod: LT

## 2022-07-20 PROCEDURE — 82435 ASSAY OF BLOOD CHLORIDE: CPT

## 2022-07-20 PROCEDURE — 85018 HEMOGLOBIN: CPT

## 2022-07-20 PROCEDURE — U0005: CPT

## 2022-07-20 PROCEDURE — C2617: CPT

## 2022-07-20 PROCEDURE — 84295 ASSAY OF SERUM SODIUM: CPT

## 2022-07-20 PROCEDURE — 36415 COLL VENOUS BLD VENIPUNCTURE: CPT

## 2022-07-20 PROCEDURE — 82803 BLOOD GASES ANY COMBINATION: CPT

## 2022-07-20 DEVICE — URETERAL CATH OPEN END 5FR 70CM: Type: IMPLANTABLE DEVICE | Status: FUNCTIONAL

## 2022-07-20 DEVICE — GUIDEWIRE SENSOR DUAL-FLEX NITINOL STRAIGHT .035" X 150CM: Type: IMPLANTABLE DEVICE | Status: FUNCTIONAL

## 2022-07-20 DEVICE — STENT URET INLAY OPTIMA 6FRX28CM: Type: IMPLANTABLE DEVICE | Status: FUNCTIONAL

## 2022-07-20 DEVICE — GUIDEWIRE AMPLATZ SUPER-STIFF STRAIGHT .035" X 145CM: Type: IMPLANTABLE DEVICE | Status: FUNCTIONAL

## 2022-07-20 RX ORDER — LIDOCAINE HCL 20 MG/ML
0.2 VIAL (ML) INJECTION ONCE
Refills: 0 | Status: DISCONTINUED | OUTPATIENT
Start: 2022-07-20 | End: 2022-08-03

## 2022-07-20 RX ORDER — OXYCODONE HYDROCHLORIDE 5 MG/1
5 TABLET ORAL ONCE
Refills: 0 | Status: DISCONTINUED | OUTPATIENT
Start: 2022-07-20 | End: 2022-07-20

## 2022-07-20 RX ORDER — CEPHALEXIN 500 MG
1 CAPSULE ORAL
Qty: 10 | Refills: 0
Start: 2022-07-20 | End: 2022-07-24

## 2022-07-20 RX ORDER — TAMSULOSIN HYDROCHLORIDE 0.4 MG/1
1 CAPSULE ORAL
Qty: 0 | Refills: 0 | DISCHARGE

## 2022-07-20 RX ORDER — SODIUM CHLORIDE 9 MG/ML
1000 INJECTION, SOLUTION INTRAVENOUS
Refills: 0 | Status: DISCONTINUED | OUTPATIENT
Start: 2022-07-20 | End: 2022-08-03

## 2022-07-20 RX ORDER — HYDROMORPHONE HYDROCHLORIDE 2 MG/ML
0.25 INJECTION INTRAMUSCULAR; INTRAVENOUS; SUBCUTANEOUS
Refills: 0 | Status: DISCONTINUED | OUTPATIENT
Start: 2022-07-20 | End: 2022-07-20

## 2022-07-20 RX ORDER — CEFAZOLIN SODIUM 1 G
2000 VIAL (EA) INJECTION ONCE
Refills: 0 | Status: DISCONTINUED | OUTPATIENT
Start: 2022-07-20 | End: 2022-08-03

## 2022-07-20 RX ORDER — INSULIN LISPRO 100/ML
4 VIAL (ML) SUBCUTANEOUS ONCE
Refills: 0 | Status: COMPLETED | OUTPATIENT
Start: 2022-07-20 | End: 2022-07-20

## 2022-07-20 RX ORDER — ONDANSETRON 8 MG/1
4 TABLET, FILM COATED ORAL ONCE
Refills: 0 | Status: COMPLETED | OUTPATIENT
Start: 2022-07-20 | End: 2022-07-20

## 2022-07-20 RX ADMIN — OXYCODONE HYDROCHLORIDE 5 MILLIGRAM(S): 5 TABLET ORAL at 16:27

## 2022-07-20 RX ADMIN — Medication 4 UNIT(S): at 15:31

## 2022-07-20 RX ADMIN — ONDANSETRON 4 MILLIGRAM(S): 8 TABLET, FILM COATED ORAL at 17:27

## 2022-07-20 RX ADMIN — OXYCODONE HYDROCHLORIDE 5 MILLIGRAM(S): 5 TABLET ORAL at 16:20

## 2022-07-20 NOTE — PRE-ANESTHESIA EVALUATION ADULT - NSPROPOSEDPROCEDFT_GEN_ALL_CORE
Left ureteroscopy laser lithotripsy with stone extraction, left stent placement
Cystoscopy, Left Ureteroscopy, Laser Lithotripsy, Stent Placement

## 2022-07-20 NOTE — ASU DISCHARGE PLAN (ADULT/PEDIATRIC) - ASU DC SPECIAL INSTRUCTIONSFT
STENT: You may have an internal stent (a hollow tube that runs from the kidney to your bladder) after your procedure, which helps urine drain from the kidney to your bladder. Some patients experience urinary frequency, burning, or even back pain (especially with urination). These sensations will gradually get better. Increasing your fluid intake can also improve these symptoms. While the stent is in place, your urine may continue to be bloody. This stent is temporary and must be removed by your urologist as an outpatient within 3 months unless otherwise specified.  GENERAL: It is common to have blood in your urine after your procedure. It may be pink or even red; inform your doctor if you have a significant amount of clot in the urine or if you are unable to void at all. The urine may clear and then become bloody again especially as you are more physically active.  BATHING: You may shower or bathe.  DIET: You may resume your regular diet and regular medication regimen.  PAIN: You may take Tylenol (acetaminophen) 650-975mg and/or Motrin (ibuprofen) 400-600mg, both available over the counter, for pain every 6 hours as needed. Do not exceed 4000mg of Tylenol (acetaminophen) daily. You may alternate these medications such that you take one or the other every 3 hours for around the clock pain coverage.  ANTIBIOTICS: You have been given a prescription for an antibiotic. Please take this medication as instructed and be sure to complete the entire course.  STOOL SOFTENERS: Do not allow yourself to become constipated as straining may cause bleeding. Take stool softeners or a laxative (ex. Miralax, Colace, Senokot, ExLax, etc), available over the counter, if needed.  ACTIVITY: No heavy lifting or strenuous exercise until you are evaluated at your post-operative appointment. Otherwise, you may return to your usual level of physical activity.  FOLLOW-UP: Please follow up with Dr. Hooker in the office on Friday.  CALL YOUR UROLOGIST IF: You have any bleeding that does not stop, inability to void >8 hours, fever over 100.4 F, chills, persistent nausea/vomiting, changes in your incision concerning for infection, or if your pain is not controlled on your discharge pain medications.

## 2022-07-20 NOTE — PRE-ANESTHESIA EVALUATION ADULT - NSANTHPMHFT_GEN_ALL_CORE
62M hx of DM2, hep A hx, HTN, HLD, hypothyroidism, kidney stones. Denies cardiac, respiratory, GI complaints. Able to ambulate multiple blocks w/o issues. 62M hx of DM2 (A1C 10.9), hep A hx, HTN, HLD, hypothyroidism, kidney stones. Denies cardiac, respiratory, GI complaints. Able to ambulate multiple blocks w/o issues.

## 2022-07-20 NOTE — ASU DISCHARGE PLAN (ADULT/PEDIATRIC) - CARE PROVIDER_API CALL
Mario Hooker)  Urology  [address], [place] [address]  Phone: [phone]  Fax: [phone]  Established Patient  Follow Up Time: 1-3 days

## 2022-07-20 NOTE — ASU DISCHARGE PLAN (ADULT/PEDIATRIC) - NS MD DC FALL RISK RISK
For information on Fall & Injury Prevention, visit: [email or web address] OR  [email or web address] OR  https://www.cdc.gov/steadi/patient.html

## 2022-07-20 NOTE — ASU DISCHARGE PLAN (ADULT/PEDIATRIC) - CALL YOUR DOCTOR IF YOU HAVE ANY OF THE FOLLOWING:
Pain not relieved by Medications/Nausea and vomiting that does not stop/Unable to urinate/Inability to tolerate liquids or foods

## 2022-07-20 NOTE — ASU PATIENT PROFILE, ADULT - FALL HARM RISK - UNIVERSAL INTERVENTIONS
Bed in lowest position, wheels locked, appropriate side rails in place/Call bell, personal items and telephone in reach/Instruct patient to call for assistance before getting out of bed or chair/Non-slip footwear when patient is out of bed/[place] to call system/Physically safe environment - no spills, clutter or unnecessary equipment/Purposeful Proactive Rounding/Room/bathroom lighting operational, light cord in reach

## 2022-07-20 NOTE — PRE-ANESTHESIA EVALUATION ADULT - NSANTHOSAYNRD_GEN_A_CORE
No. LES screening performed.  STOP BANG Legend: 0-2 = LOW Risk; 3-4 = INTERMEDIATE Risk; 5-8 = HIGH Risk
No. LES screening performed.  STOP BANG Legend: 0-2 = LOW Risk; 3-4 = INTERMEDIATE Risk; 5-8 = HIGH Risk

## 2022-07-20 NOTE — PRE-ANESTHESIA EVALUATION ADULT - NSANTHVITALSIGNSFT_GEN_ALL_CORE
Vital Signs Last 24 Hrs  T(C): 37.2 (20 Jul 2022 11:49), Max: 37.2 (20 Jul 2022 10:02)  T(F): 99 (20 Jul 2022 10:02), Max: 99 (20 Jul 2022 10:02)  HR: 76 (20 Jul 2022 11:49) (76 - 76)  BP: 159/88 (20 Jul 2022 11:49) (159/88 - 159/88)  BP(mean): --  RR: 16 (20 Jul 2022 11:49) (16 - 16)  SpO2: 98% (20 Jul 2022 11:49) (98% - 98%)

## 2022-07-22 ENCOUNTER — APPOINTMENT (OUTPATIENT)
Dept: UROLOGY | Facility: CLINIC | Age: 62
End: 2022-07-22

## 2022-07-22 VITALS
WEIGHT: 200 LBS | TEMPERATURE: 98.1 F | DIASTOLIC BLOOD PRESSURE: 89 MMHG | SYSTOLIC BLOOD PRESSURE: 178 MMHG | HEART RATE: 94 BPM | RESPIRATION RATE: 16 BRPM | OXYGEN SATURATION: 96 % | BODY MASS INDEX: 30.41 KG/M2

## 2022-07-22 PROCEDURE — 99214 OFFICE O/P EST MOD 30 MIN: CPT | Mod: 25

## 2022-07-22 PROCEDURE — 99072 ADDL SUPL MATRL&STAF TM PHE: CPT

## 2022-07-22 PROCEDURE — 51702 INSERT TEMP BLADDER CATH: CPT

## 2022-07-22 NOTE — HISTORY OF PRESENT ILLNESS
[FreeTextEntry1] : Status post attempted left ureteroscopy.\par Patient has a left ureteral stricture.  Stent was placed.\par \par Voiding trial today.\par Patient was given a voiding trial today. The patient's bladder was filled with 300 cc of water. Patient was able to void spontaneously.\par \par Second stage left ureteroscopy in 1 month.\par \par Please refer to URO Consult note

## 2022-07-22 NOTE — LETTER BODY
[FreeTextEntry1] : Alanis Rowland MD \par [address] [facility], \[place] [address]\par [phone]\par \par Dear Dr. Rowland, \par \par Reason for Visit: s/p left ureteroscopy. Left ureteral stone. Renal colic\par \par This is a 62 year-old gentleman with left renal colic and left ureteral stone. He previously underwent left ureteroscopy in February 2022. The patient recently went to the emergency department for left renal colic. His hospital CT scan demonstrated a 4 mm left ureteral stone and moderate left hydroureteronephrosis. Since he was last seen, the patient underwent an attempted left ureteroscopy. The patient has a left ureteral stricture distal to the left ureteral stone..  The ureteroscope could not navigate to the stone secondary to stricture.  I left stent was placed to allow for passive dilation of the stricture.. The patient denies any gross hematuria or dysuria or urinary incontinence. The patient denies any aggravating or relieving factors. The patient denies any interference of function. All other review of systems are negative. Past medical history, family history and social history were inquired and were noncontributory to current condition. The patient does not use tobacco or drink alcohol. Medications and allergies were reviewed. He has no known allergies to medication. \par \par On examination, the patient is in no acute distress. He is alert and oriented follows commands. He has normal mood and affect. He is normocephalic. Oral no thrush. Neck is supple. Respirations are unlabored. His abdomen is soft and nontender. Liver is nonpalpable. Bladder is nonpalpable. No CVA tenderness. Neurologically he is grossly intact. No peripheral edema. Skin without gross abnormality. \par \par Patient was given a voiding trial today. The patient's bladder was filled with 300 cc of water. Patient was able to void spontaneously.\par \par Post-void residual on bladder scan today was 0 cc. \par \par Assessment: S/p attempted left ureteroscopy. Left renal colic. Left ureteral stone. \par \par I counseled the patient. I discussed the various etiologies of his symptoms. I recommended he patient undergo second stage of left ureteroscopy in 1 month. I counseled the patient regarding the procedure. The risks and benefits were discussed. Alternatives were given. I answered the patient questions. The patient will take the necessary preparations for the procedure. I also recommended the patient continue Flomax as stone expulsion therapy. I discussed the potential side effects of the medication. I counseled the patient on its use and side effects. If the patient develops any side effects, the patient will discontinue the medication and contact me. The patient will follow-up as directed and will contact me with any questions or concerns. Thank you for the opportunity to participate in the care of this patient. I'll keep you updated on his progress.\par \par Plan: Second stage of left ureteroscopy in 1 month. Continue Flomax. Follow up as directed. \par

## 2022-07-22 NOTE — ADDENDUM
[FreeTextEntry1] : Entered by PHILLIP ROCHA , acting as scribe for Dr. Mario Hooker.\par The documentation recorded by the scribe accurately reflects the service I personally performed and the decisions made by me.

## 2022-08-03 ENCOUNTER — OUTPATIENT (OUTPATIENT)
Dept: OUTPATIENT SERVICES | Facility: HOSPITAL | Age: 62
LOS: 1 days | End: 2022-08-03
Payer: MEDICAID

## 2022-08-03 VITALS
WEIGHT: 195.99 LBS | HEIGHT: 68 IN | HEART RATE: 70 BPM | SYSTOLIC BLOOD PRESSURE: 138 MMHG | TEMPERATURE: 98 F | RESPIRATION RATE: 14 BRPM | DIASTOLIC BLOOD PRESSURE: 76 MMHG | OXYGEN SATURATION: 98 %

## 2022-08-03 DIAGNOSIS — Z00.00 ENCOUNTER FOR GENERAL ADULT MEDICAL EXAMINATION WITHOUT ABNORMAL FINDINGS: ICD-10-CM

## 2022-08-03 DIAGNOSIS — Z98.890 OTHER SPECIFIED POSTPROCEDURAL STATES: Chronic | ICD-10-CM

## 2022-08-03 DIAGNOSIS — N20.1 CALCULUS OF URETER: ICD-10-CM

## 2022-08-03 DIAGNOSIS — Z01.818 ENCOUNTER FOR OTHER PREPROCEDURAL EXAMINATION: ICD-10-CM

## 2022-08-03 DIAGNOSIS — Z96.0 PRESENCE OF UROGENITAL IMPLANTS: Chronic | ICD-10-CM

## 2022-08-03 LAB
A1C WITH ESTIMATED AVERAGE GLUCOSE RESULT: 10.6 % — HIGH (ref 4–5.6)
ANION GAP SERPL CALC-SCNC: 16 MMOL/L — SIGNIFICANT CHANGE UP (ref 5–17)
BUN SERPL-MCNC: 10 MG/DL — SIGNIFICANT CHANGE UP (ref 7–23)
CALCIUM SERPL-MCNC: 9.3 MG/DL — SIGNIFICANT CHANGE UP (ref 8.4–10.5)
CHLORIDE SERPL-SCNC: 99 MMOL/L — SIGNIFICANT CHANGE UP (ref 96–108)
CO2 SERPL-SCNC: 25 MMOL/L — SIGNIFICANT CHANGE UP (ref 22–31)
CREAT SERPL-MCNC: 0.83 MG/DL — SIGNIFICANT CHANGE UP (ref 0.5–1.3)
EGFR: 100 ML/MIN/1.73M2 — SIGNIFICANT CHANGE UP
ESTIMATED AVERAGE GLUCOSE: 258 MG/DL — HIGH (ref 68–114)
GLUCOSE SERPL-MCNC: 234 MG/DL — HIGH (ref 70–99)
HCT VFR BLD CALC: 44.5 % — SIGNIFICANT CHANGE UP (ref 39–50)
HGB BLD-MCNC: 14.9 G/DL — SIGNIFICANT CHANGE UP (ref 13–17)
MCHC RBC-ENTMCNC: 27 PG — SIGNIFICANT CHANGE UP (ref 27–34)
MCHC RBC-ENTMCNC: 33.5 GM/DL — SIGNIFICANT CHANGE UP (ref 32–36)
MCV RBC AUTO: 80.8 FL — SIGNIFICANT CHANGE UP (ref 80–100)
NRBC # BLD: 0 /100 WBCS — SIGNIFICANT CHANGE UP (ref 0–0)
PLATELET # BLD AUTO: 235 K/UL — SIGNIFICANT CHANGE UP (ref 150–400)
POTASSIUM SERPL-MCNC: 3.3 MMOL/L — LOW (ref 3.5–5.3)
POTASSIUM SERPL-SCNC: 3.3 MMOL/L — LOW (ref 3.5–5.3)
RBC # BLD: 5.51 M/UL — SIGNIFICANT CHANGE UP (ref 4.2–5.8)
RBC # FLD: 12.8 % — SIGNIFICANT CHANGE UP (ref 10.3–14.5)
SODIUM SERPL-SCNC: 140 MMOL/L — SIGNIFICANT CHANGE UP (ref 135–145)
WBC # BLD: 8.05 K/UL — SIGNIFICANT CHANGE UP (ref 3.8–10.5)
WBC # FLD AUTO: 8.05 K/UL — SIGNIFICANT CHANGE UP (ref 3.8–10.5)

## 2022-08-03 PROCEDURE — 87086 URINE CULTURE/COLONY COUNT: CPT

## 2022-08-03 PROCEDURE — 80048 BASIC METABOLIC PNL TOTAL CA: CPT

## 2022-08-03 PROCEDURE — G0463: CPT

## 2022-08-03 PROCEDURE — 85027 COMPLETE CBC AUTOMATED: CPT

## 2022-08-03 PROCEDURE — 83036 HEMOGLOBIN GLYCOSYLATED A1C: CPT

## 2022-08-03 PROCEDURE — 36415 COLL VENOUS BLD VENIPUNCTURE: CPT

## 2022-08-03 RX ORDER — ALBUTEROL 90 UG/1
0 AEROSOL, METERED ORAL
Qty: 0 | Refills: 0 | DISCHARGE

## 2022-08-03 RX ORDER — CHOLECALCIFEROL (VITAMIN D3) 125 MCG
1 CAPSULE ORAL
Qty: 0 | Refills: 0 | DISCHARGE

## 2022-08-03 NOTE — H&P PST ADULT - FALL HARM RISK - UNIVERSAL INTERVENTIONS
Bed in lowest position, wheels locked, appropriate side rails in place/Call bell, personal items and telephone in reach/Instruct patient to call for assistance before getting out of bed or chair/Non-slip footwear when patient is out of bed/[place] to call system/Physically safe environment - no spills, clutter or unnecessary equipment/Purposeful Proactive Rounding/Room/bathroom lighting operational, light cord in reach ---

## 2022-08-03 NOTE — H&P PST ADULT - HISTORY OF PRESENT ILLNESS
62 year old male with PMH of HTN, DM, Nephrolithiasis, S/P Laser lithotripsy  2/2022 . He reports that he had recurrent left flank pain,  he  underwent CT which demonstrated imaging revealed left ureteral stone. Patient subsequently underwent  Left ureteroscopy laser lithotripsy stone extraction stent placement on 6/20/2022. Patient was noted have a stricture distal to the stone and need second stage ureteroscopy. He presents to [facility] today for evaluation prior to Left Ureteroscopy, Laser Lithotripsy, Stent Placement on 8/17/2022.    [facility]

## 2022-08-03 NOTE — H&P PST ADULT - NSICDXPASTMEDICALHX_GEN_ALL_CORE_FT
PAST MEDICAL HISTORY:  COVID-19 -12/2020 Fever 14days chills bodyaches no hospitalization mild asthma    DM (diabetes mellitus) -type ii    History of hepatitis A -6/2020    HTN (hypertension)     Hyperlipidemia     Hypothyroidism     Kidney stones 2004 right 2021 left    Obesity (BMI 30.0-34.9)

## 2022-08-03 NOTE — H&P PST ADULT - PROBLEM SELECTOR PLAN 1
Left Ureteroscopy, Laser Lithotripsy, Stent Placement   -cbc, bmp, A1c done at [facility]  -medical evaluation  -preop instructions   -instructed to hold metformin, glipizide 24 hours preop  -instructed to reduce lantus dose by 20% evening before surgery  -fingerstick on admit

## 2022-08-04 LAB
CULTURE RESULTS: SIGNIFICANT CHANGE UP
SPECIMEN SOURCE: SIGNIFICANT CHANGE UP

## 2022-08-14 ENCOUNTER — OUTPATIENT (OUTPATIENT)
Dept: OUTPATIENT SERVICES | Facility: HOSPITAL | Age: 62
LOS: 1 days | End: 2022-08-14
Payer: MEDICAID

## 2022-08-14 DIAGNOSIS — Z98.890 OTHER SPECIFIED POSTPROCEDURAL STATES: Chronic | ICD-10-CM

## 2022-08-14 DIAGNOSIS — Z11.52 ENCOUNTER FOR SCREENING FOR COVID-19: ICD-10-CM

## 2022-08-14 DIAGNOSIS — Z96.0 PRESENCE OF UROGENITAL IMPLANTS: Chronic | ICD-10-CM

## 2022-08-14 LAB — SARS-COV-2 RNA SPEC QL NAA+PROBE: SIGNIFICANT CHANGE UP

## 2022-08-14 PROCEDURE — U0003: CPT

## 2022-08-14 PROCEDURE — C9803: CPT

## 2022-08-14 PROCEDURE — U0005: CPT

## 2022-08-17 ENCOUNTER — APPOINTMENT (OUTPATIENT)
Dept: UROLOGY | Facility: HOSPITAL | Age: 62
End: 2022-08-17

## 2022-08-26 ENCOUNTER — NON-APPOINTMENT (OUTPATIENT)
Age: 62
End: 2022-08-26

## 2022-08-28 LAB
APPEARANCE: ABNORMAL
BACTERIA UR CULT: NORMAL
BACTERIA: NEGATIVE
BILIRUBIN URINE: NEGATIVE
BLOOD URINE: ABNORMAL
COLOR: YELLOW
GLUCOSE QUALITATIVE U: ABNORMAL
HYALINE CASTS: 0 /LPF
KETONES URINE: NEGATIVE
LEUKOCYTE ESTERASE URINE: NEGATIVE
MICROSCOPIC-UA: NORMAL
NITRITE URINE: NEGATIVE
PH URINE: 7
PROTEIN URINE: ABNORMAL
RED BLOOD CELLS URINE: 366 /HPF
SPECIFIC GRAVITY URINE: 1.02
SQUAMOUS EPITHELIAL CELLS: 2 /HPF
UROBILINOGEN URINE: NORMAL
WHITE BLOOD CELLS URINE: 4 /HPF

## 2022-08-29 ENCOUNTER — NON-APPOINTMENT (OUTPATIENT)
Age: 62
End: 2022-08-29

## 2022-09-09 PROBLEM — E11.9 TYPE 2 DIABETES MELLITUS WITHOUT COMPLICATIONS: Chronic | Status: ACTIVE | Noted: 2021-11-15

## 2022-09-09 PROBLEM — U07.1 COVID-19: Chronic | Status: ACTIVE | Noted: 2022-06-29

## 2022-09-09 PROBLEM — Z86.19 PERSONAL HISTORY OF OTHER INFECTIOUS AND PARASITIC DISEASES: Chronic | Status: ACTIVE | Noted: 2022-06-29

## 2022-09-14 ENCOUNTER — OUTPATIENT (OUTPATIENT)
Dept: OUTPATIENT SERVICES | Facility: HOSPITAL | Age: 62
LOS: 1 days | End: 2022-09-14
Payer: MEDICAID

## 2022-09-14 VITALS
WEIGHT: 190.04 LBS | OXYGEN SATURATION: 98 % | RESPIRATION RATE: 20 BRPM | HEART RATE: 75 BPM | TEMPERATURE: 98 F | HEIGHT: 68 IN | DIASTOLIC BLOOD PRESSURE: 89 MMHG | SYSTOLIC BLOOD PRESSURE: 158 MMHG

## 2022-09-14 DIAGNOSIS — Z98.890 OTHER SPECIFIED POSTPROCEDURAL STATES: Chronic | ICD-10-CM

## 2022-09-14 DIAGNOSIS — N20.1 CALCULUS OF URETER: ICD-10-CM

## 2022-09-14 DIAGNOSIS — Z96.0 PRESENCE OF UROGENITAL IMPLANTS: Chronic | ICD-10-CM

## 2022-09-14 DIAGNOSIS — E11.9 TYPE 2 DIABETES MELLITUS WITHOUT COMPLICATIONS: ICD-10-CM

## 2022-09-14 DIAGNOSIS — Z01.818 ENCOUNTER FOR OTHER PREPROCEDURAL EXAMINATION: ICD-10-CM

## 2022-09-14 DIAGNOSIS — Z00.00 ENCOUNTER FOR GENERAL ADULT MEDICAL EXAMINATION WITHOUT ABNORMAL FINDINGS: ICD-10-CM

## 2022-09-14 LAB
A1C WITH ESTIMATED AVERAGE GLUCOSE RESULT: 8.5 % — HIGH (ref 4–5.6)
ANION GAP SERPL CALC-SCNC: 14 MMOL/L — SIGNIFICANT CHANGE UP (ref 5–17)
BUN SERPL-MCNC: 17 MG/DL — SIGNIFICANT CHANGE UP (ref 7–23)
CALCIUM SERPL-MCNC: 9.7 MG/DL — SIGNIFICANT CHANGE UP (ref 8.4–10.5)
CHLORIDE SERPL-SCNC: 102 MMOL/L — SIGNIFICANT CHANGE UP (ref 96–108)
CO2 SERPL-SCNC: 23 MMOL/L — SIGNIFICANT CHANGE UP (ref 22–31)
CREAT SERPL-MCNC: 0.98 MG/DL — SIGNIFICANT CHANGE UP (ref 0.5–1.3)
EGFR: 88 ML/MIN/1.73M2 — SIGNIFICANT CHANGE UP
ESTIMATED AVERAGE GLUCOSE: 197 MG/DL — HIGH (ref 68–114)
GLUCOSE SERPL-MCNC: 103 MG/DL — HIGH (ref 70–99)
HCT VFR BLD CALC: 43.1 % — SIGNIFICANT CHANGE UP (ref 39–50)
HGB BLD-MCNC: 14 G/DL — SIGNIFICANT CHANGE UP (ref 13–17)
MCHC RBC-ENTMCNC: 26.8 PG — LOW (ref 27–34)
MCHC RBC-ENTMCNC: 32.5 GM/DL — SIGNIFICANT CHANGE UP (ref 32–36)
MCV RBC AUTO: 82.6 FL — SIGNIFICANT CHANGE UP (ref 80–100)
NRBC # BLD: 0 /100 WBCS — SIGNIFICANT CHANGE UP (ref 0–0)
PLATELET # BLD AUTO: 258 K/UL — SIGNIFICANT CHANGE UP (ref 150–400)
POTASSIUM SERPL-MCNC: 3.8 MMOL/L — SIGNIFICANT CHANGE UP (ref 3.5–5.3)
POTASSIUM SERPL-SCNC: 3.8 MMOL/L — SIGNIFICANT CHANGE UP (ref 3.5–5.3)
RBC # BLD: 5.22 M/UL — SIGNIFICANT CHANGE UP (ref 4.2–5.8)
RBC # FLD: 13.3 % — SIGNIFICANT CHANGE UP (ref 10.3–14.5)
SODIUM SERPL-SCNC: 139 MMOL/L — SIGNIFICANT CHANGE UP (ref 135–145)
WBC # BLD: 7.89 K/UL — SIGNIFICANT CHANGE UP (ref 3.8–10.5)
WBC # FLD AUTO: 7.89 K/UL — SIGNIFICANT CHANGE UP (ref 3.8–10.5)

## 2022-09-14 PROCEDURE — 85027 COMPLETE CBC AUTOMATED: CPT

## 2022-09-14 PROCEDURE — 80048 BASIC METABOLIC PNL TOTAL CA: CPT

## 2022-09-14 PROCEDURE — 83036 HEMOGLOBIN GLYCOSYLATED A1C: CPT

## 2022-09-14 PROCEDURE — G0463: CPT

## 2022-09-14 PROCEDURE — 36415 COLL VENOUS BLD VENIPUNCTURE: CPT

## 2022-09-14 RX ORDER — CHOLECALCIFEROL (VITAMIN D3) 125 MCG
1 CAPSULE ORAL
Qty: 0 | Refills: 0 | DISCHARGE

## 2022-09-14 RX ORDER — ACETAMINOPHEN 500 MG
1 TABLET ORAL
Qty: 0 | Refills: 0 | DISCHARGE

## 2022-09-14 RX ORDER — CEFAZOLIN SODIUM 1 G
2000 VIAL (EA) INJECTION ONCE
Refills: 0 | Status: DISCONTINUED | OUTPATIENT
Start: 2022-09-21 | End: 2022-10-05

## 2022-09-14 NOTE — H&P PST ADULT - NSICDXPASTSURGICALHX_GEN_ALL_CORE_FT
PAST SURGICAL HISTORY:  S/P colonoscopy     S/P cystoscopy with ureteral stent placement -june 20, 2022    S/P endoscopy     Status post laser lithotripsy of ureteral calculus 2/3/2022

## 2022-09-14 NOTE — H&P PST ADULT - FALL HARM RISK - UNIVERSAL INTERVENTIONS
Bed in lowest position, wheels locked, appropriate side rails in place/Call bell, personal items and telephone in reach/Instruct patient to call for assistance before getting out of bed or chair/Non-slip footwear when patient is out of bed/[place] to call system/Physically safe environment - no spills, clutter or unnecessary equipment/Purposeful Proactive Rounding/Room/bathroom lighting operational, light cord in reach

## 2022-09-14 NOTE — H&P PST ADULT - HISTORY OF PRESENT ILLNESS
62 year old male with PMH of HTN, DM, Nephrolithiasis, S/P Laser lithotripsy  2/2022 . He reports that he had recurrent left flank pain,  he  underwent CT which demonstrated imaging revealed left ureteral stone. Patient subsequently underwent  Left ureteroscopy laser lithotripsy stone extraction stent placement on 6/20/2022. Patient was noted have a stricture distal to the stone and need second stage ureteroscopy. He presents to [facility] today for evaluation prior to Left Ureteroscopy, Laser Lithotripsy, Stent Placement on 8/17/2022. Procedure previously canceled due to high HGA1c, has since had Jardiance added to his regimen.  Now scheduled for 9/21/22.    preop covid swab  9/18/22 @ [facility] 62 year old male with PMH of HTN, DM, Nephrolithiasis, S/P Laser lithotripsy  2/2022 . He reports that he had recurrent left flank pain,  he  underwent CT which demonstrated imaging revealed left ureteral stone. Patient subsequently underwent  Left ureteroscopy laser lithotripsy stone extraction stent placement on 6/20/2022. Patient was noted have a stricture distal to the stone and need second stage ureteroscopy. He presents to [facility] today for evaluation prior to Left Ureteroscopy, Laser Lithotripsy, Stent Placement on 8/17/2022. Procedure previously canceled due to high HGA1c, has since had Jardiance/glipizide added to his regimen.  Now scheduled for 9/21/22.    preop covid swab  9/18/22 @ [facility]

## 2022-09-14 NOTE — H&P PST ADULT - PROBLEM SELECTOR PLAN 1
left ureteroscopy, laser lithotripsy, stone extraction, stent placement  retrieve last office note from PMD

## 2022-09-18 ENCOUNTER — OUTPATIENT (OUTPATIENT)
Dept: OUTPATIENT SERVICES | Facility: HOSPITAL | Age: 62
LOS: 1 days | End: 2022-09-18
Payer: MEDICAID

## 2022-09-18 DIAGNOSIS — Z98.890 OTHER SPECIFIED POSTPROCEDURAL STATES: Chronic | ICD-10-CM

## 2022-09-18 DIAGNOSIS — Z96.0 PRESENCE OF UROGENITAL IMPLANTS: Chronic | ICD-10-CM

## 2022-09-18 DIAGNOSIS — Z11.52 ENCOUNTER FOR SCREENING FOR COVID-19: ICD-10-CM

## 2022-09-18 LAB — SARS-COV-2 RNA SPEC QL NAA+PROBE: DETECTED

## 2022-09-18 PROCEDURE — U0005: CPT

## 2022-09-18 PROCEDURE — U0003: CPT

## 2022-09-18 PROCEDURE — C9803: CPT

## 2022-09-20 ENCOUNTER — TRANSCRIPTION ENCOUNTER (OUTPATIENT)
Age: 62
End: 2022-09-20

## 2022-09-21 ENCOUNTER — TRANSCRIPTION ENCOUNTER (OUTPATIENT)
Age: 62
End: 2022-09-21

## 2022-09-21 ENCOUNTER — APPOINTMENT (OUTPATIENT)
Dept: UROLOGY | Facility: HOSPITAL | Age: 62
End: 2022-09-21

## 2022-09-21 ENCOUNTER — RESULT REVIEW (OUTPATIENT)
Age: 62
End: 2022-09-21

## 2022-09-21 ENCOUNTER — OUTPATIENT (OUTPATIENT)
Dept: INPATIENT UNIT | Facility: HOSPITAL | Age: 62
LOS: 1 days | End: 2022-09-21
Payer: MEDICAID

## 2022-09-21 VITALS
DIASTOLIC BLOOD PRESSURE: 87 MMHG | HEART RATE: 73 BPM | HEIGHT: 68 IN | WEIGHT: 190.04 LBS | SYSTOLIC BLOOD PRESSURE: 151 MMHG | TEMPERATURE: 98 F | OXYGEN SATURATION: 97 % | RESPIRATION RATE: 17 BRPM

## 2022-09-21 VITALS — OXYGEN SATURATION: 98 % | RESPIRATION RATE: 16 BRPM | HEART RATE: 82 BPM

## 2022-09-21 DIAGNOSIS — Z00.00 ENCOUNTER FOR GENERAL ADULT MEDICAL EXAMINATION WITHOUT ABNORMAL FINDINGS: ICD-10-CM

## 2022-09-21 DIAGNOSIS — Z98.890 OTHER SPECIFIED POSTPROCEDURAL STATES: Chronic | ICD-10-CM

## 2022-09-21 DIAGNOSIS — N20.1 CALCULUS OF URETER: ICD-10-CM

## 2022-09-21 DIAGNOSIS — Z96.0 PRESENCE OF UROGENITAL IMPLANTS: Chronic | ICD-10-CM

## 2022-09-21 LAB
GLUCOSE BLDC GLUCOMTR-MCNC: 138 MG/DL — HIGH (ref 70–99)
GLUCOSE BLDC GLUCOMTR-MCNC: 140 MG/DL — HIGH (ref 70–99)

## 2022-09-21 PROCEDURE — C1889: CPT

## 2022-09-21 PROCEDURE — C1758: CPT

## 2022-09-21 PROCEDURE — 52332 CYSTOSCOPY AND TREATMENT: CPT | Mod: LT

## 2022-09-21 PROCEDURE — C1766: CPT

## 2022-09-21 PROCEDURE — 74420 UROGRAPHY RTRGR +-KUB: CPT | Mod: 26

## 2022-09-21 PROCEDURE — 52352 CYSTOURETERO W/STONE REMOVE: CPT | Mod: LT

## 2022-09-21 PROCEDURE — 82962 GLUCOSE BLOOD TEST: CPT

## 2022-09-21 PROCEDURE — C9399: CPT

## 2022-09-21 PROCEDURE — C2617: CPT

## 2022-09-21 PROCEDURE — C1769: CPT

## 2022-09-21 PROCEDURE — 88300 SURGICAL PATH GROSS: CPT | Mod: 26

## 2022-09-21 PROCEDURE — C1894: CPT

## 2022-09-21 PROCEDURE — 88300 SURGICAL PATH GROSS: CPT

## 2022-09-21 PROCEDURE — 82365 CALCULUS SPECTROSCOPY: CPT

## 2022-09-21 PROCEDURE — 76000 FLUOROSCOPY <1 HR PHYS/QHP: CPT

## 2022-09-21 DEVICE — GUIDEWIRE SENSOR DUAL-FLEX NITINOL STRAIGHT .035" X 150CM: Type: IMPLANTABLE DEVICE | Site: LEFT | Status: FUNCTIONAL

## 2022-09-21 DEVICE — IMPLANTABLE DEVICE: Type: IMPLANTABLE DEVICE | Site: LEFT | Status: FUNCTIONAL

## 2022-09-21 DEVICE — URETERAL SHEATH NAVIGATOR HD 11/13FR X 46CM: Type: IMPLANTABLE DEVICE | Site: LEFT | Status: FUNCTIONAL

## 2022-09-21 DEVICE — STONE BASKET ZEROTIP NITINOL 4-WIRE 1.9FR 120CM X 12MM: Type: IMPLANTABLE DEVICE | Site: LEFT | Status: FUNCTIONAL

## 2022-09-21 DEVICE — STENT URET INLAY OPTIMA 6FRX26CM: Type: IMPLANTABLE DEVICE | Site: LEFT | Status: FUNCTIONAL

## 2022-09-21 DEVICE — CATH STEERABLE IRR ASPIRATION 70CM: Type: IMPLANTABLE DEVICE | Site: LEFT | Status: FUNCTIONAL

## 2022-09-21 DEVICE — URETERAL CATH OPEN END 5FR 70CM: Type: IMPLANTABLE DEVICE | Site: LEFT | Status: FUNCTIONAL

## 2022-09-21 DEVICE — DILATOR SHEATH SET 8/10: Type: IMPLANTABLE DEVICE | Site: LEFT | Status: FUNCTIONAL

## 2022-09-21 RX ORDER — ASPIRIN/CALCIUM CARB/MAGNESIUM 324 MG
0 TABLET ORAL
Qty: 0 | Refills: 0 | DISCHARGE

## 2022-09-21 RX ORDER — REPAGLINIDE 1 MG/1
0 TABLET ORAL
Qty: 0 | Refills: 0 | DISCHARGE

## 2022-09-21 RX ORDER — SODIUM CHLORIDE 9 MG/ML
3 INJECTION INTRAMUSCULAR; INTRAVENOUS; SUBCUTANEOUS EVERY 8 HOURS
Refills: 0 | Status: DISCONTINUED | OUTPATIENT
Start: 2022-09-21 | End: 2022-09-21

## 2022-09-21 RX ORDER — ONDANSETRON 8 MG/1
4 TABLET, FILM COATED ORAL ONCE
Refills: 0 | Status: DISCONTINUED | OUTPATIENT
Start: 2022-09-21 | End: 2022-09-21

## 2022-09-21 RX ORDER — SIMVASTATIN 20 MG/1
1 TABLET, FILM COATED ORAL
Qty: 0 | Refills: 0 | DISCHARGE

## 2022-09-21 RX ORDER — INSULIN GLARGINE 100 [IU]/ML
30 INJECTION, SOLUTION SUBCUTANEOUS
Qty: 0 | Refills: 0 | DISCHARGE

## 2022-09-21 RX ORDER — MULTIVIT-MIN/FERROUS GLUCONATE 9 MG/15 ML
1 LIQUID (ML) ORAL
Qty: 0 | Refills: 0 | DISCHARGE

## 2022-09-21 RX ORDER — FENTANYL CITRATE 50 UG/ML
25 INJECTION INTRAVENOUS
Refills: 0 | Status: DISCONTINUED | OUTPATIENT
Start: 2022-09-21 | End: 2022-09-21

## 2022-09-21 RX ORDER — IBUPROFEN 200 MG
400 TABLET ORAL ONCE
Refills: 0 | Status: COMPLETED | OUTPATIENT
Start: 2022-09-21 | End: 2022-09-21

## 2022-09-21 RX ORDER — LABETALOL HCL 100 MG
1 TABLET ORAL
Qty: 0 | Refills: 0 | DISCHARGE

## 2022-09-21 RX ORDER — LOSARTAN POTASSIUM 100 MG/1
1 TABLET, FILM COATED ORAL
Qty: 0 | Refills: 0 | DISCHARGE

## 2022-09-21 RX ORDER — EMPAGLIFLOZIN 10 MG/1
0 TABLET, FILM COATED ORAL
Qty: 0 | Refills: 0 | DISCHARGE

## 2022-09-21 RX ORDER — AMLODIPINE BESYLATE 2.5 MG/1
1 TABLET ORAL
Qty: 0 | Refills: 0 | DISCHARGE

## 2022-09-21 RX ORDER — METFORMIN HYDROCHLORIDE 850 MG/1
1 TABLET ORAL
Qty: 0 | Refills: 0 | DISCHARGE

## 2022-09-21 RX ORDER — LIDOCAINE HCL 20 MG/ML
0.2 VIAL (ML) INJECTION ONCE
Refills: 0 | Status: DISCONTINUED | OUTPATIENT
Start: 2022-09-21 | End: 2022-09-21

## 2022-09-21 RX ORDER — LEVOTHYROXINE SODIUM 125 MCG
1 TABLET ORAL
Qty: 0 | Refills: 0 | DISCHARGE

## 2022-09-21 RX ADMIN — Medication 400 MILLIGRAM(S): at 14:00

## 2022-09-21 RX ADMIN — Medication 400 MILLIGRAM(S): at 13:45

## 2022-09-21 NOTE — PATIENT PROFILE ADULT - NSPROMEDSBROUGHTTOHOSP_GEN_A_NUR
This patient is a very pleasant 80 year old male sent to the ED from [facility] for complaint of pain, erythema, warmth, edema in the bursa overlying his left elbow. He and his wife at bedside report that the swelling and pain has been notable for several weeks, however acutely worsened in the last 4-5 days and became associated with general malaise, fever, and fatigue. He reports the [facility] for evaluation, who recommended he reported the emergency department. He has been seen by orthopedic surgery ([place]) in the emergency department, who are planning incision and drainage of the left elbow tomorrow. He has concomitant past medical history of chronic obstructive pulmonary disease, hyperlipidemia, rheumatoid arthritis, hyperlipidemia, essential hypertension. Bedside evaluation discloses a normally developed male in no acute distress. There is a tennis ball sized area of edema, erythema, warmth overlying his left elbow which is tender to palpation, and exacerbated by movement of the joint.        Assessment:  1. Septic bursitis  2. Chronic obstructive pulmonary disease  3. Hypertension  4. Hyperlipidemia  5. Rheumatoid arthritis    Plan:  We will admit the patient to the surgical floor. He will be made NPO at midnight for surgical intervention of left elbow septic bursitis in the morning. RT/O2 per protocol as well as continue home medications for COPD. Continue all medications also for hypertension, hyperlipidemia, symptomatically for rheumatoid arthritis.    Please see concomitant H&P from intern Hali Moran for more detailed discussion.   no

## 2022-09-21 NOTE — ASU DISCHARGE PLAN (ADULT/PEDIATRIC) - NS MD DC FALL RISK RISK
For information on Fall & Injury Prevention, visit: [email or web address] OR  [email or web address] OR  https://www.cdc.gov/steadi/patient.html

## 2022-09-21 NOTE — ASU DISCHARGE PLAN (ADULT/PEDIATRIC) - CARE PROVIDER_API CALL
Mario Hooker)  Urology  [address] [place] [address], [place] [address]  Phone: [phone]  Fax: [phone]  Follow Up Time: 2 weeks

## 2022-09-21 NOTE — ASU DISCHARGE PLAN (ADULT/PEDIATRIC) - ASU DC SPECIAL INSTRUCTIONSFT
STENT: You may have an internal stent (a hollow tube that runs from the kidney to your bladder) after your procedure, which helps urine drain from the kidney to your bladder. Some patients experience urinary frequency, burning, or even back pain (especially with urination). These sensations will gradually get better. Increasing your fluid intake can also improve these symptoms. While the stent is in place, your urine may continue to be bloody. This stent is temporary and must be removed by your urologist as an outpatient with in 3 months unless otherwise specified. .  GENERAL: It is common to have blood in your urine after your procedure. It may be pink or even red; inform your doctor if you have a significant amount of clot in the urine or if you are unable to void at all. The urine may clear and then become bloody again especially as you are more physically active.  BATHING: You may shower or bathe.  DIET: You may resume your regular diet and regular medication regimen.  PAIN: You may take Tylenol (acetaminophen) 650-975mg and/or Motrin (ibuprofen) 400-600mg, both available over the counter, for pain every 6 hours as needed. Do not exceed 4000mg of Tylenol (acetaminophen) daily. You may alternate these medications such that you take one or the other every 3 hours for around the clock pain coverage.   ANTIBIOTICS: You may be given a prescription for an antibiotic, please take this medication as instructed and be sure to complete the entire course.  STOOL SOFTENERS: Do not allow yourself to become constipated as straining may cause bleeding. Take stool softeners or a laxative (ex. Miralax, Colace, Senokot, ExLax, etc), available over the counter, if needed.  ACTIVITY: No heavy lifting or strenuous exercise until you are evaluated at your post-operative appointment. Otherwise, you may return to your usual level of physical activity.  ANTICOAGULATION: If you are taking any blood thinning medications, please discuss with your urologist prior to restarting these medications unless otherwise specified.  FOLLOW-UP: If you did not already schedule your post-operative appointment, please call your urologist to schedule and follow-up appointment.  CALL YOUR UROLOGIST IF: You have any bleeding that does not stop, inability to void >8 hours, fever over 100.4 F, chills, persistent nausea/vomiting, changes in your incision concerning for infection, or if your pain is not controlled on your discharge pain medications.

## 2022-09-21 NOTE — PATIENT PROFILE ADULT - FALL HARM RISK - UNIVERSAL INTERVENTIONS
Bed in lowest position, wheels locked, appropriate side rails in place/Call bell, personal items and telephone in reach/Instruct patient to call for assistance before getting out of bed or chair/Non-slip footwear when patient is out of bed/[place] to call system/Physically safe environment - no spills, clutter or unnecessary equipment/Purposeful Proactive Rounding/Room/bathroom lighting operational, light cord in reach

## 2022-09-26 LAB — SURGICAL PATHOLOGY STUDY: SIGNIFICANT CHANGE UP

## 2022-09-30 LAB — NIDUS STONE QN: SIGNIFICANT CHANGE UP

## 2022-10-07 ENCOUNTER — APPOINTMENT (OUTPATIENT)
Dept: UROLOGY | Facility: CLINIC | Age: 62
End: 2022-10-07

## 2022-10-07 VITALS
TEMPERATURE: 98 F | SYSTOLIC BLOOD PRESSURE: 153 MMHG | DIASTOLIC BLOOD PRESSURE: 75 MMHG | OXYGEN SATURATION: 99 % | RESPIRATION RATE: 16 BRPM | BODY MASS INDEX: 29.5 KG/M2 | HEART RATE: 87 BPM | WEIGHT: 194 LBS

## 2022-10-07 PROCEDURE — 52310 CYSTOSCOPY AND TREATMENT: CPT

## 2022-10-07 PROCEDURE — 99072 ADDL SUPL MATRL&STAF TM PHE: CPT

## 2022-10-08 LAB
APPEARANCE: CLEAR
BACTERIA: NEGATIVE
BILIRUBIN URINE: NEGATIVE
BLOOD URINE: ABNORMAL
COLOR: YELLOW
GLUCOSE QUALITATIVE U: ABNORMAL
HYALINE CASTS: 0 /LPF
KETONES URINE: NEGATIVE
LEUKOCYTE ESTERASE URINE: ABNORMAL
MICROSCOPIC-UA: NORMAL
NITRITE URINE: NEGATIVE
PH URINE: 6.5
PROTEIN URINE: ABNORMAL
RED BLOOD CELLS URINE: 31 /HPF
SPECIFIC GRAVITY URINE: 1.03
SQUAMOUS EPITHELIAL CELLS: 1 /HPF
UROBILINOGEN URINE: NORMAL
WHITE BLOOD CELLS URINE: 23 /HPF

## 2022-10-09 LAB — BACTERIA UR CULT: NORMAL

## 2022-11-04 ENCOUNTER — APPOINTMENT (OUTPATIENT)
Dept: UROLOGY | Facility: CLINIC | Age: 62
End: 2022-11-04

## 2022-11-04 PROCEDURE — 76775 US EXAM ABDO BACK WALL LIM: CPT

## 2022-11-04 PROCEDURE — 99213 OFFICE O/P EST LOW 20 MIN: CPT

## 2022-11-04 PROCEDURE — 99072 ADDL SUPL MATRL&STAF TM PHE: CPT

## 2022-11-04 NOTE — LETTER BODY
[FreeTextEntry1] : Alanis Rowland MD \par [address] [facility] [place], \[place] [address]\par [phone]\par \par Dear Dr. Rowland, \par \par Reason for Visit: Left ureteral stone s/p left ureteroscopy.\par \par This is a 62 year-old gentleman with left ureteral stone s/p left ureteroscopy. He previously underwent left ureteroscopy in February 2022. The patient recently went to the emergency department for left renal colic. His hospital CT scan demonstrated a 4 mm left ureteral stone and moderate left hydroureteronephrosis. The patient underwent successful second stage left ureteroscopy. The patient has his stent removed in October 2022. The patient returns today for follow up renal ultrasound. Since he was last seen, the patient reports taking Flomax regularly without any difficulties or side effects. Patient reports improvement of symptoms on medical therapy. The patient denies any gross hematuria or dysuria or urinary incontinence. The patient denies any aggravating or relieving factors. The patient denies any interference of function. All other review of systems are negative. Past medical history, family history and social history were inquired and were noncontributory to current condition. The patient does not use tobacco or drink alcohol. Medications and allergies were reviewed. He has no known allergies to medication. \par \par On examination, the patient is in no acute distress. He is alert and oriented follows commands. He has normal mood and affect. He is normocephalic. Oral no thrush. Neck is supple. Respirations are unlabored. His abdomen is soft and nontender. Liver is nonpalpable. Bladder is nonpalpable. No CVA tenderness. Neurologically he is grossly intact. No peripheral edema. Skin without gross abnormality. \par \par I personally reviewed ultrasound images with the patient today and images demonstrated both kidneys appear normal in size and echogenicity. No stones, solid masses or hydronephrosis visualized.\par \par Assessment: Left ureteral stone, s/p left ureteroscopy.\par \par I counseled the patient. I discussed the various etiologies of his symptoms. In terms of his left ureteral stone, his ultrasound today demonstrated resolution of stones. I encouraged patient to maintain a low-protein low-sodium diet. I also encouraged hydration to prevent stone formation. I also recommended the patient continue Flomax as stone expulsion therapy. I renewed the patient's prescription for Flomax today. I encouraged the patient to continue medications regularly as directed. The patient will follow-up as directed and will contact me with any questions or concerns. Thank you for the opportunity to participate in the care of this patient. I'll keep you updated on his progress.\par \par Plan: Continue Flomax. Stone diet. Follow up in 6 months.

## 2022-11-04 NOTE — ADDENDUM
[FreeTextEntry1] : Entered by PHILLIP ROCHA, acting as scribe for Dr. Mario Hooker.\par The documentation recorded by the scribe accurately reflects the service I personally performed and the decisions made by me.

## 2023-01-27 DIAGNOSIS — Z00.00 ENCOUNTER FOR GENERAL ADULT MEDICAL EXAMINATION W/OUT ABNORMAL FINDINGS: ICD-10-CM

## 2023-01-30 ENCOUNTER — RX RENEWAL (OUTPATIENT)
Age: 63
End: 2023-01-30

## 2023-01-30 RX ORDER — TAMSULOSIN HYDROCHLORIDE 0.4 MG/1
0.4 CAPSULE ORAL
Qty: 90 | Refills: 3 | Status: ACTIVE | COMMUNITY
Start: 2022-06-21 | End: 1900-01-01

## 2023-02-26 LAB
ANION GAP SERPL CALC-SCNC: 15 MMOL/L
APO LP(A) SERPL-MCNC: 26.9 NMOL/L
APPEARANCE: CLEAR
BACTERIA: NEGATIVE
BILIRUBIN URINE: NEGATIVE
BLOOD URINE: NEGATIVE
BUN SERPL-MCNC: 17 MG/DL
CALCIUM SERPL-MCNC: 10 MG/DL
CHLORIDE SERPL-SCNC: 102 MMOL/L
CHOLEST SERPL-MCNC: 130 MG/DL
CO2 SERPL-SCNC: 21 MMOL/L
COLOR: NORMAL
CREAT SERPL-MCNC: 0.8 MG/DL
CRP SERPL HS-MCNC: 1.16 MG/L
EGFR: 101 ML/MIN/1.73M2
ESTIMATED AVERAGE GLUCOSE: 194 MG/DL
ESTRADIOL SERPL-MCNC: 7 PG/ML
GLUCOSE QUALITATIVE U: ABNORMAL
GLUCOSE SERPL-MCNC: 200 MG/DL
HBA1C MFR BLD HPLC: 8.4 %
HDLC SERPL-MCNC: 51 MG/DL
HYALINE CASTS: 0 /LPF
KETONES URINE: NEGATIVE
LDLC SERPL CALC-MCNC: 62 MG/DL
LEUKOCYTE ESTERASE URINE: NEGATIVE
LH SERPL-ACNC: 9.3 IU/L
MICROSCOPIC-UA: NORMAL
NITRITE URINE: NEGATIVE
NONHDLC SERPL-MCNC: 79 MG/DL
PH URINE: 6
POTASSIUM SERPL-SCNC: 4.3 MMOL/L
PROLACTIN SERPL-MCNC: 6.9 NG/ML
PROTEIN URINE: NORMAL
PSA FREE FLD-MCNC: 20 %
PSA FREE SERPL-MCNC: 0.04 NG/ML
PSA SERPL-MCNC: 0.2 NG/ML
RED BLOOD CELLS URINE: 3 /HPF
SODIUM SERPL-SCNC: 138 MMOL/L
SPECIFIC GRAVITY URINE: 1.05
SQUAMOUS EPITHELIAL CELLS: 0 /HPF
TRIGL SERPL-MCNC: 89 MG/DL
UROBILINOGEN URINE: NORMAL
WHITE BLOOD CELLS URINE: 1 /HPF

## 2023-02-27 NOTE — H&P PST ADULT - NSICDXPASTSURGICALHX_GEN_ALL_CORE_FT
Yes PAST SURGICAL HISTORY:  S/P colonoscopy     S/P cystoscopy with ureteral stent placement -june 20, 2022    S/P endoscopy     Status post laser lithotripsy of ureteral calculus 2/3/2022

## 2023-02-28 ENCOUNTER — NON-APPOINTMENT (OUTPATIENT)
Age: 63
End: 2023-02-28

## 2023-03-03 LAB
TESTOST FREE SERPL-MCNC: 5.4 PG/ML
TESTOST SERPL-MCNC: 285 NG/DL

## 2023-03-14 RX ORDER — SILDENAFIL 20 MG/1
20 TABLET ORAL
Qty: 30 | Refills: 3 | Status: ACTIVE | COMMUNITY
Start: 2023-03-02 | End: 1900-01-01

## 2023-05-05 ENCOUNTER — APPOINTMENT (OUTPATIENT)
Dept: UROLOGY | Facility: CLINIC | Age: 63
End: 2023-05-05

## 2023-05-28 ENCOUNTER — EMERGENCY (EMERGENCY)
Facility: HOSPITAL | Age: 63
LOS: 1 days | Discharge: HOME | End: 2023-05-28
Attending: STUDENT IN AN ORGANIZED HEALTH CARE EDUCATION/TRAINING PROGRAM
Payer: MEDICAID

## 2023-05-28 VITALS
TEMPERATURE: 98 F | SYSTOLIC BLOOD PRESSURE: 169 MMHG | OXYGEN SATURATION: 100 % | HEART RATE: 67 BPM | DIASTOLIC BLOOD PRESSURE: 88 MMHG | RESPIRATION RATE: 17 BRPM

## 2023-05-28 VITALS
HEIGHT: 68 IN | SYSTOLIC BLOOD PRESSURE: 165 MMHG | WEIGHT: 182.98 LBS | OXYGEN SATURATION: 97 % | DIASTOLIC BLOOD PRESSURE: 81 MMHG | HEART RATE: 72 BPM | TEMPERATURE: 98 F | RESPIRATION RATE: 16 BRPM

## 2023-05-28 DIAGNOSIS — Z98.890 OTHER SPECIFIED POSTPROCEDURAL STATES: Chronic | ICD-10-CM

## 2023-05-28 DIAGNOSIS — Z96.0 PRESENCE OF UROGENITAL IMPLANTS: Chronic | ICD-10-CM

## 2023-05-28 LAB
ALBUMIN SERPL ELPH-MCNC: 3.6 G/DL — SIGNIFICANT CHANGE UP (ref 3.5–5)
ALP SERPL-CCNC: 96 U/L — SIGNIFICANT CHANGE UP (ref 40–120)
ALT FLD-CCNC: 24 U/L DA — SIGNIFICANT CHANGE UP (ref 10–60)
ANION GAP SERPL CALC-SCNC: 5 MMOL/L — SIGNIFICANT CHANGE UP (ref 5–17)
APTT BLD: 31.4 SEC — SIGNIFICANT CHANGE UP (ref 27.5–35.5)
AST SERPL-CCNC: 16 U/L — SIGNIFICANT CHANGE UP (ref 10–40)
BASOPHILS # BLD AUTO: 0.04 K/UL — SIGNIFICANT CHANGE UP (ref 0–0.2)
BASOPHILS NFR BLD AUTO: 0.6 % — SIGNIFICANT CHANGE UP (ref 0–2)
BILIRUB SERPL-MCNC: 0.5 MG/DL — SIGNIFICANT CHANGE UP (ref 0.2–1.2)
BUN SERPL-MCNC: 15 MG/DL — SIGNIFICANT CHANGE UP (ref 7–18)
CALCIUM SERPL-MCNC: 9.4 MG/DL — SIGNIFICANT CHANGE UP (ref 8.4–10.5)
CHLORIDE SERPL-SCNC: 107 MMOL/L — SIGNIFICANT CHANGE UP (ref 96–108)
CO2 SERPL-SCNC: 24 MMOL/L — SIGNIFICANT CHANGE UP (ref 22–31)
CREAT SERPL-MCNC: 0.91 MG/DL — SIGNIFICANT CHANGE UP (ref 0.5–1.3)
EGFR: 96 ML/MIN/1.73M2 — SIGNIFICANT CHANGE UP
EOSINOPHIL # BLD AUTO: 0.16 K/UL — SIGNIFICANT CHANGE UP (ref 0–0.5)
EOSINOPHIL NFR BLD AUTO: 2.2 % — SIGNIFICANT CHANGE UP (ref 0–6)
GLUCOSE SERPL-MCNC: 315 MG/DL — HIGH (ref 70–99)
HCT VFR BLD CALC: 44.6 % — SIGNIFICANT CHANGE UP (ref 39–50)
HGB BLD-MCNC: 14.6 G/DL — SIGNIFICANT CHANGE UP (ref 13–17)
IMM GRANULOCYTES NFR BLD AUTO: 0.4 % — SIGNIFICANT CHANGE UP (ref 0–0.9)
INR BLD: 0.94 RATIO — SIGNIFICANT CHANGE UP (ref 0.88–1.16)
LYMPHOCYTES # BLD AUTO: 2.54 K/UL — SIGNIFICANT CHANGE UP (ref 1–3.3)
LYMPHOCYTES # BLD AUTO: 35.3 % — SIGNIFICANT CHANGE UP (ref 13–44)
MCHC RBC-ENTMCNC: 26.8 PG — LOW (ref 27–34)
MCHC RBC-ENTMCNC: 32.7 GM/DL — SIGNIFICANT CHANGE UP (ref 32–36)
MCV RBC AUTO: 81.8 FL — SIGNIFICANT CHANGE UP (ref 80–100)
MONOCYTES # BLD AUTO: 0.51 K/UL — SIGNIFICANT CHANGE UP (ref 0–0.9)
MONOCYTES NFR BLD AUTO: 7.1 % — SIGNIFICANT CHANGE UP (ref 2–14)
NEUTROPHILS # BLD AUTO: 3.92 K/UL — SIGNIFICANT CHANGE UP (ref 1.8–7.4)
NEUTROPHILS NFR BLD AUTO: 54.4 % — SIGNIFICANT CHANGE UP (ref 43–77)
NRBC # BLD: 0 /100 WBCS — SIGNIFICANT CHANGE UP (ref 0–0)
PLATELET # BLD AUTO: 179 K/UL — SIGNIFICANT CHANGE UP (ref 150–400)
POTASSIUM SERPL-MCNC: 3.7 MMOL/L — SIGNIFICANT CHANGE UP (ref 3.5–5.3)
POTASSIUM SERPL-SCNC: 3.7 MMOL/L — SIGNIFICANT CHANGE UP (ref 3.5–5.3)
PROT SERPL-MCNC: 6.9 G/DL — SIGNIFICANT CHANGE UP (ref 6–8.3)
PROTHROM AB SERPL-ACNC: 11.2 SEC — SIGNIFICANT CHANGE UP (ref 10.5–13.4)
RBC # BLD: 5.45 M/UL — SIGNIFICANT CHANGE UP (ref 4.2–5.8)
RBC # FLD: 13.1 % — SIGNIFICANT CHANGE UP (ref 10.3–14.5)
SODIUM SERPL-SCNC: 136 MMOL/L — SIGNIFICANT CHANGE UP (ref 135–145)
TROPONIN I, HIGH SENSITIVITY RESULT: 4.3 NG/L — SIGNIFICANT CHANGE UP
WBC # BLD: 7.2 K/UL — SIGNIFICANT CHANGE UP (ref 3.8–10.5)
WBC # FLD AUTO: 7.2 K/UL — SIGNIFICANT CHANGE UP (ref 3.8–10.5)

## 2023-05-28 PROCEDURE — 70498 CT ANGIOGRAPHY NECK: CPT | Mod: 26,MA

## 2023-05-28 PROCEDURE — 70496 CT ANGIOGRAPHY HEAD: CPT | Mod: MA

## 2023-05-28 PROCEDURE — 93005 ELECTROCARDIOGRAM TRACING: CPT

## 2023-05-28 PROCEDURE — 85610 PROTHROMBIN TIME: CPT

## 2023-05-28 PROCEDURE — 71045 X-RAY EXAM CHEST 1 VIEW: CPT | Mod: 26

## 2023-05-28 PROCEDURE — 70498 CT ANGIOGRAPHY NECK: CPT | Mod: MA

## 2023-05-28 PROCEDURE — 71045 X-RAY EXAM CHEST 1 VIEW: CPT

## 2023-05-28 PROCEDURE — 80053 COMPREHEN METABOLIC PANEL: CPT

## 2023-05-28 PROCEDURE — 96375 TX/PRO/DX INJ NEW DRUG ADDON: CPT | Mod: XU

## 2023-05-28 PROCEDURE — 85730 THROMBOPLASTIN TIME PARTIAL: CPT

## 2023-05-28 PROCEDURE — 84484 ASSAY OF TROPONIN QUANT: CPT

## 2023-05-28 PROCEDURE — 70496 CT ANGIOGRAPHY HEAD: CPT | Mod: 26,MA

## 2023-05-28 PROCEDURE — 99285 EMERGENCY DEPT VISIT HI MDM: CPT

## 2023-05-28 PROCEDURE — 96374 THER/PROPH/DIAG INJ IV PUSH: CPT | Mod: XU

## 2023-05-28 PROCEDURE — 99285 EMERGENCY DEPT VISIT HI MDM: CPT | Mod: 25

## 2023-05-28 PROCEDURE — 36415 COLL VENOUS BLD VENIPUNCTURE: CPT

## 2023-05-28 PROCEDURE — 85025 COMPLETE CBC W/AUTO DIFF WBC: CPT

## 2023-05-28 RX ORDER — MECLIZINE HCL 12.5 MG
1 TABLET ORAL
Qty: 15 | Refills: 0
Start: 2023-05-28

## 2023-05-28 RX ORDER — SODIUM CHLORIDE 9 MG/ML
1000 INJECTION INTRAMUSCULAR; INTRAVENOUS; SUBCUTANEOUS ONCE
Refills: 0 | Status: COMPLETED | OUTPATIENT
Start: 2023-05-28 | End: 2023-05-28

## 2023-05-28 RX ORDER — FAMOTIDINE 10 MG/ML
20 INJECTION INTRAVENOUS ONCE
Refills: 0 | Status: COMPLETED | OUTPATIENT
Start: 2023-05-28 | End: 2023-05-28

## 2023-05-28 RX ORDER — METOCLOPRAMIDE HCL 10 MG
10 TABLET ORAL ONCE
Refills: 0 | Status: COMPLETED | OUTPATIENT
Start: 2023-05-28 | End: 2023-05-28

## 2023-05-28 RX ORDER — MECLIZINE HCL 12.5 MG
25 TABLET ORAL ONCE
Refills: 0 | Status: COMPLETED | OUTPATIENT
Start: 2023-05-28 | End: 2023-05-28

## 2023-05-28 RX ADMIN — Medication 25 MILLIGRAM(S): at 15:43

## 2023-05-28 RX ADMIN — SODIUM CHLORIDE 1000 MILLILITER(S): 9 INJECTION INTRAMUSCULAR; INTRAVENOUS; SUBCUTANEOUS at 15:43

## 2023-05-28 RX ADMIN — Medication 10 MILLIGRAM(S): at 15:44

## 2023-05-28 RX ADMIN — FAMOTIDINE 20 MILLIGRAM(S): 10 INJECTION INTRAVENOUS at 15:44

## 2023-05-28 NOTE — ED PROVIDER NOTE - PATIENT PORTAL LINK FT
You can access the FollowMyHealth Patient Portal offered by [facility] by registering at the following website: [email or web address]. By joining [facility]’s FollowMyHealth portal, you will also be able to view your health information using other applications (apps) compatible with our system.

## 2023-05-28 NOTE — ED ADULT NURSE NOTE - NS ED PATIENT SAFETY CONCERN
No Xelcarolz Pregnancy And Lactation Text: This medication is Pregnancy Category D and is not considered safe during pregnancy.  The risk during breast feeding is also uncertain.

## 2023-05-28 NOTE — ED PROVIDER NOTE - CLINICAL SUMMARY MEDICAL DECISION MAKING FREE TEXT BOX
Eligio: 63-year-old male with past medical history hypertension, diabetes, kidney stones status post lithotripsy presents with dizziness since this morning.  Patient reports waking up this morning with dizziness described as room spinning sensation, worse with head movement and position change.  Patient reports 2 episodes of nonbloody nonbilious emesis secondary to dizziness per patient.  Patient states afterwards he started experiencing left-sided nonradiating chest discomfort.  Denies any fevers, shortness of breath, vision change, difficulty speaking, hearing loss, tinnitus, abdominal pain, diarrhea, bloody stools, black tarry stools, numbness, weakness, or rash.  Denies any recent injury or trauma.  Denies any history of similar episodes in the past.  Denies any history of CAD, states he had angiogram ~2 years ago, normal per pt. Dizziness likely peripheral vertigo; no evidence of a cardiac arrythmia such as Brugada, WPW, HOCM, long or short QT.  Neurologic exam is nonfocal, not c/w CVA or primary neurologic abnormality. CP ?secondary to vomiting, not exertional in nature. Will obtain labs, imaging, provide supportive treatment with dispo pending workup.

## 2023-05-28 NOTE — ED PROVIDER NOTE - PHYSICAL EXAMINATION
CONSTITUTIONAL: non-toxic, well appearing  SKIN: no rash, no petechiae.  EYES: PERRL, EOMI, pink conjunctiva, anicteric  ENT: tongue and uvular midline, no exudates, moist mucous membranes  NECK: Supple; no meningismus, no JVD  CARD: RRR, no murmurs, equal radial pulses bilaterally 2+  RESP: CTAB, no respiratory distress  ABD: Soft, non-tender, non-distended, no peritoneal signs, no CVA tenderness  EXT: Normal ROM x4. No edema.   NEURO: Alert, oriented. Neuro exam nonfocal, equal strength bilaterally. CN II-XII intact. Steady gait.   PSYCH: Cooperative, appropriate.

## 2023-05-28 NOTE — ED PROVIDER NOTE - OBJECTIVE STATEMENT
63-year-old male with past medical history hypertension, diabetes, kidney stones status post lithotripsy presents with dizziness since this morning.  Patient reports waking up this morning with dizziness described as room spinning sensation, worse with head movement and position change.  Patient reports 2 episodes of nonbloody nonbilious emesis secondary to dizziness per patient.  Patient states afterwards he started experiencing left-sided nonradiating chest discomfort.  Denies any fevers, shortness of breath, vision change, difficulty speaking, hearing loss, tinnitus, abdominal pain, diarrhea, bloody stools, black tarry stools, numbness, weakness, or rash.  Denies any recent injury or trauma.  Denies any history of similar episodes in the past.  Denies any history of CAD, states he had angiogram ~2 years ago, normal per pt. Denies any additional complaints.

## 2023-05-28 NOTE — ED PROVIDER NOTE - NSFOLLOWUPINSTRUCTIONS_ED_ALL_ED_FT
Dizziness    Dizziness can manifest as a feeling of unsteadiness or light-headedness. You may feel like you are about to faint. This condition can be caused by a number of things, including medicines, dehydration, or illness. Drink enough fluid to keep your urine clear or pale yellow. Do not drink alcohol and limit your caffeine intake. Avoid quick or sudden movements.  Rise slowly from chairs and steady yourself until you feel okay. In the morning, first sit up on the side of the bed.    Take prescription meclizine up to 3 times a day as needed for dizziness.     Take over the counter acetaminophen (Tylenol) 650-1000 mg every 4-6 hours as needed for pain. Do not take more than 3000 mg in a 24 hour period. Be aware many over the counter and prescription medications also contain acetaminophen (Tylenol).     Follow up with your primary care physician and neurologist as discussed.     Follow up with neurosurgery within 1 week re: brain aneurysm.     SEEK IMMEDIATE MEDICAL CARE IF YOU HAVE ANY OF THE FOLLOWING SYMPTOMS: vomiting, changes in your vision or speech, weakness in your arms or legs, trouble speaking or swallowing, chest pain, abdominal pain, shortness of breath, sweating, bleeding, headache, neck pain, or fever.

## 2023-05-28 NOTE — ED ADULT NURSE NOTE - CHPI ED NUR SYMPTOMS NEG
no back pain/no chills/no congestion/no fever/no nausea/no shortness of breath/no syncope/no vomiting

## 2023-05-28 NOTE — ED PROVIDER NOTE - PROGRESS NOTE DETAILS
Agustin-: pt seen and re-evaluated at bedside.  Pt states his symptoms have improved, dizziness, nausea, and CP resolved.  Pt comfortable in NAD, ambulatory with steady gait. Patient with incidental finding of 2 mm aneurysm, discussed neurosurgery follow up/return precautions. Will DC with PMD/neuro/NSG follow up, return precautions, pt understood and agreeable with plan.

## 2023-05-28 NOTE — ED ADULT NURSE NOTE - OBJECTIVE STATEMENT
Pt AOx4, ambulatory, c/o epigastric pain, elevated BP, and dizziness since this morning. Pt denies SOB. No neuro deficit noted.

## 2023-05-28 NOTE — ED ADULT NURSE NOTE - NSFALLUNIVINTERV_ED_ALL_ED
Bed/Stretcher in lowest position, wheels locked, appropriate side rails in place/Call bell, personal items and telephone in reach/Instruct patient to call for assistance before getting out of bed/chair/stretcher/Non-slip footwear applied when patient is off stretcher/[place] to call system/Physically safe environment - no spills, clutter or unnecessary equipment/Purposeful proactive rounding/Room/bathroom lighting operational, light cord in reach

## 2023-09-02 ENCOUNTER — EMERGENCY (EMERGENCY)
Facility: HOSPITAL | Age: 63
LOS: 1 days | Discharge: HOME | End: 2023-09-02
Attending: STUDENT IN AN ORGANIZED HEALTH CARE EDUCATION/TRAINING PROGRAM
Payer: MEDICAID

## 2023-09-02 VITALS
HEIGHT: 68 IN | WEIGHT: 182.1 LBS | SYSTOLIC BLOOD PRESSURE: 164 MMHG | DIASTOLIC BLOOD PRESSURE: 85 MMHG | HEART RATE: 73 BPM | TEMPERATURE: 98 F | RESPIRATION RATE: 18 BRPM | OXYGEN SATURATION: 98 %

## 2023-09-02 DIAGNOSIS — Z96.0 PRESENCE OF UROGENITAL IMPLANTS: Chronic | ICD-10-CM

## 2023-09-02 DIAGNOSIS — Z98.890 OTHER SPECIFIED POSTPROCEDURAL STATES: Chronic | ICD-10-CM

## 2023-09-02 PROCEDURE — 99284 EMERGENCY DEPT VISIT MOD MDM: CPT | Mod: 25

## 2023-09-02 PROCEDURE — 73140 X-RAY EXAM OF FINGER(S): CPT

## 2023-09-02 PROCEDURE — 11740 EVACUATION SUBUNGUAL HMTMA: CPT | Mod: F1

## 2023-09-02 PROCEDURE — 99283 EMERGENCY DEPT VISIT LOW MDM: CPT | Mod: 25

## 2023-09-02 PROCEDURE — 73140 X-RAY EXAM OF FINGER(S): CPT | Mod: 26,LT

## 2023-09-02 PROCEDURE — 11740 EVACUATION SUBUNGUAL HMTMA: CPT

## 2023-09-02 RX ORDER — IBUPROFEN 200 MG
600 TABLET ORAL ONCE
Refills: 0 | Status: COMPLETED | OUTPATIENT
Start: 2023-09-02 | End: 2023-09-02

## 2023-09-02 RX ADMIN — Medication 600 MILLIGRAM(S): at 18:23

## 2023-09-02 RX ADMIN — Medication 600 MILLIGRAM(S): at 18:28

## 2023-09-02 NOTE — ED PROCEDURE NOTE - NS ED ATTENDING STATEMENT MOD
This was a shared visit with the LEXII. I reviewed and verified the documentation and independently performed the documented:

## 2023-09-02 NOTE — ED PROVIDER NOTE - NS ED ATTENDING STATEMENT MOD
Pre-Visit Chart Review  For Appointment Scheduled on 2/15/19    Health Maintenance Due   Topic Date Due    TETANUS VACCINE  05/13/1965    Colonoscopy  05/13/1997    Influenza Vaccine  08/01/2018                    This was a shared visit with the LEXII. I reviewed and verified the documentation and independently performed the documented:

## 2023-09-02 NOTE — ED PROVIDER NOTE - NSFOLLOWUPINSTRUCTIONS_ED_ALL_ED_FT
Follow up with the primary care doctor in 1 week.  For pain you can take over the counter Ibuprofen 600 mg orally every 6 hours as needed for pain. Take medication with food.   If you experience any new or worsening symptoms or if you are concerned you can always come back to the emergency for a re-evaluation.  **Official radiology report by the radiologist will be available within 24 hours. If there is a discrepancy you will contacted.

## 2023-09-02 NOTE — ED PROVIDER NOTE - PATIENT PORTAL LINK FT
You can access the FollowMyHealth Patient Portal offered by [facility] by registering at the following website: [email or web address]. By joining [facility]’s FollowMyHealth portal, you will also be able to view your health information using other applications (apps) compatible with our system.

## 2023-09-02 NOTE — ED PROVIDER NOTE - CLINICAL SUMMARY MEDICAL DECISION MAKING FREE TEXT BOX
63 year old male s/p crushed injury of the finger. Will get x-ray and trephination and reassess. 63 year old male s/p crushed injury of the finger. Will get x-ray and trephination and reassess.    XR neg for fracture. Trephination performed with improvement of pain and deformity. Will dc with PMD follow up.

## 2023-09-02 NOTE — ED PROVIDER NOTE - MUSCULOSKELETAL, MLM
Left index finger with full range of motion. Distal phalanx with swelling, nail elevation and 100% subungual hematoma. Small bruising to the distal phalanx.

## 2023-09-02 NOTE — ED ADULT NURSE NOTE - NSFALLUNIVINTERV_ED_ALL_ED
Call bell, personal items and telephone in reach/Instruct patient to call for assistance before getting out of bed/chair/stretcher/Non-slip footwear applied when patient is off stretcher/[place] to call system/Physically safe environment - no spills, clutter or unnecessary equipment/Purposeful proactive rounding/Room/bathroom lighting operational, light cord in reach

## 2023-09-02 NOTE — ED PROCEDURE NOTE - PROCEDURE ADDITIONAL DETAILS
Trephination of a subungual hematoma to left 2nd digit, pain improved, bacitracin applied, given after care instructions

## 2023-09-02 NOTE — ED PROVIDER NOTE - OBJECTIVE STATEMENT
63 year old male with a PMHx of HTN and DM presents to the ED with complaints of finger pain. Patient states yesterday he had a crushed injury to the left index finger when the finger was caught and the door closed on him. Reports swelling and pain to the fingertip. No other complaints.

## 2024-01-05 ENCOUNTER — APPOINTMENT (OUTPATIENT)
Dept: UROLOGY | Facility: CLINIC | Age: 64
End: 2024-01-05
Payer: COMMERCIAL

## 2024-01-05 VITALS — TEMPERATURE: 97.6 F

## 2024-01-05 DIAGNOSIS — N20.1 CALCULUS OF URETER: ICD-10-CM

## 2024-01-05 PROCEDURE — 99214 OFFICE O/P EST MOD 30 MIN: CPT

## 2024-01-05 RX ORDER — TADALAFIL 20 MG/1
20 TABLET ORAL
Qty: 12 | Refills: 3 | Status: ACTIVE | COMMUNITY
Start: 2024-01-05 | End: 1900-01-01

## 2024-01-06 LAB
ANION GAP SERPL CALC-SCNC: 15 MMOL/L
BUN SERPL-MCNC: 15 MG/DL
CALCIUM SERPL-MCNC: 10 MG/DL
CHLORIDE SERPL-SCNC: 101 MMOL/L
CO2 SERPL-SCNC: 22 MMOL/L
CREAT SERPL-MCNC: 0.82 MG/DL
EGFR: 100 ML/MIN/1.73M2
GLUCOSE SERPL-MCNC: 113 MG/DL
POTASSIUM SERPL-SCNC: 4 MMOL/L
PSA FREE FLD-MCNC: 20 %
PSA FREE SERPL-MCNC: 0.04 NG/ML
PSA SERPL-MCNC: 0.19 NG/ML
SODIUM SERPL-SCNC: 138 MMOL/L

## 2024-01-06 NOTE — ADDENDUM
[FreeTextEntry1] : Entered by Britta Ramirez, acting as scribe for Dr. Mario Hooker. The documentation recorded by the scribe accurately reflects the service I personally performed and the decisions made by me.

## 2024-01-06 NOTE — LETTER BODY
[FreeTextEntry1] : Alanis Rowland MD  [id number] [id number] [phone]    Dear Dr. Rowland,    Reason for Visit: Left ureteral stone s/p left ureteroscopy. Erectile Dysfunction    This is a 63 year-old gentleman with left ureteral stone s/p left ureteroscopy. He previously underwent left ureteroscopy in February 2022. The patient recently went to the emergency department for left renal colic. His hospital CT scan demonstrated a 4 mm left ureteral stone and moderate left hydroureteronephrosis. The patient underwent successful second stage left ureteroscopy. The patient has his stent removed in October 2022. The patient returns today for follow up renal ultrasound. Ultrasound today demonstrated no stones. Since he was last seen, the patient reports of erectile dysfunction. Patient reports normal libido and sex drive. However he notes decreased penile tumescence. Patient does not smoke. The patient denies any gross hematuria or dysuria or urinary incontinence. The patient denies any aggravating or relieving factors. The patient denies any interference of function. All other review of systems are negative. Past medical history, family history and social history were inquired and were noncontributory to current condition. The patient does not use tobacco or drink alcohol. Medications and allergies were reviewed. He has no known allergies to medication.    On examination, the patient is in no acute distress. He is alert and oriented follows commands. He has normal mood and affect. He is normocephalic. Oral no thrush. Neck is supple. Respirations are unlabored. His abdomen is soft and nontender. Liver is nonpalpable. Bladder is nonpalpable. No CVA tenderness. Neurologically he is grossly intact. No peripheral edema. Skin without gross abnormality.    I personally reviewed ultrasound images with the patient today and images demonstrated both kidneys appear normal in size and echogenicity, no stones, solid masses or hydronephrosis visualized.    Assessment: Left ureteral stone, s/p left ureteroscopy. Erectile Dysfunction.     I counseled the patient. I discussed the various etiologies of his symptoms. In terms of his left ureteral stone, his ultrasound today demonstrated no stones. In terms of his erectile dysfunction, I discussed the various etiologies of erectile dysfunction. I recommend to him that he consider trying PDE 5 inhibitors. I discussed the potential side effects of the medication. I counseled the patient on its use and side effects of the medication. If the patient develops any side effects, the patient will discontinue the medication and contact me. He will repeat PSA and BMP to establish baseline. Risk and alternatives were discussed. I answered his questions. The patient will follow-up as directed and will contact me with any questions or concerns. Thank you for the opportunity to participate in the care of this patient. I'll keep you updated on his progress.    Plan: Trial of Cialis. PSA. BMP. Follow up in 6 months.

## 2024-11-22 ENCOUNTER — APPOINTMENT (OUTPATIENT)
Dept: CARDIOLOGY | Facility: CLINIC | Age: 64
End: 2024-11-22

## 2025-01-10 ENCOUNTER — APPOINTMENT (OUTPATIENT)
Dept: UROLOGY | Facility: CLINIC | Age: 65
End: 2025-01-10
Payer: COMMERCIAL

## 2025-01-10 DIAGNOSIS — N20.1 CALCULUS OF URETER: ICD-10-CM

## 2025-01-10 PROCEDURE — 99214 OFFICE O/P EST MOD 30 MIN: CPT

## 2025-01-10 PROCEDURE — G2211 COMPLEX E/M VISIT ADD ON: CPT

## 2025-01-10 PROCEDURE — 76775 US EXAM ABDO BACK WALL LIM: CPT

## 2025-01-11 LAB
ANION GAP SERPL CALC-SCNC: 11 MMOL/L
BUN SERPL-MCNC: 15 MG/DL
CALCIUM SERPL-MCNC: 9.9 MG/DL
CHLORIDE SERPL-SCNC: 102 MMOL/L
CO2 SERPL-SCNC: 26 MMOL/L
CREAT SERPL-MCNC: 1.03 MG/DL
EGFR: 82 ML/MIN/1.73M2
GLUCOSE SERPL-MCNC: 175 MG/DL
POTASSIUM SERPL-SCNC: 4.7 MMOL/L
PSA FREE FLD-MCNC: 18 %
PSA FREE SERPL-MCNC: 0.04 NG/ML
PSA SERPL-MCNC: 0.25 NG/ML
SODIUM SERPL-SCNC: 140 MMOL/L

## 2025-06-06 ENCOUNTER — APPOINTMENT (OUTPATIENT)
Dept: PULMONOLOGY | Facility: CLINIC | Age: 65
End: 2025-06-06

## 2025-06-09 ENCOUNTER — APPOINTMENT (OUTPATIENT)
Dept: PULMONOLOGY | Facility: CLINIC | Age: 65
End: 2025-06-09
Payer: COMMERCIAL

## 2025-06-09 VITALS
DIASTOLIC BLOOD PRESSURE: 80 MMHG | HEIGHT: 68 IN | SYSTOLIC BLOOD PRESSURE: 184 MMHG | HEART RATE: 89 BPM | WEIGHT: 191 LBS | OXYGEN SATURATION: 97 % | TEMPERATURE: 97.2 F | BODY MASS INDEX: 28.95 KG/M2

## 2025-06-09 PROBLEM — R06.00 DYSPNEA: Status: ACTIVE | Noted: 2025-06-09

## 2025-06-09 PROBLEM — Z83.3 FAMILY HISTORY OF DIABETES MELLITUS: Status: ACTIVE | Noted: 2025-06-09

## 2025-06-09 PROCEDURE — 99203 OFFICE O/P NEW LOW 30 MIN: CPT

## 2025-06-09 RX ORDER — METFORMIN HYDROCHLORIDE 750 MG/1
TABLET ORAL
Refills: 0 | Status: ACTIVE | COMMUNITY

## 2025-06-09 RX ORDER — HYDROCHLOROTHIAZIDE 12.5 MG/1
TABLET ORAL
Refills: 0 | Status: ACTIVE | COMMUNITY

## 2025-06-09 RX ORDER — SIMVASTATIN 80 MG/1
TABLET, FILM COATED ORAL
Refills: 0 | Status: ACTIVE | COMMUNITY

## 2025-06-09 RX ORDER — SERTRALINE 25 MG/1
TABLET, FILM COATED ORAL
Refills: 0 | Status: ACTIVE | COMMUNITY

## 2025-06-09 RX ORDER — LORATADINE 10 MG
CAPSULE ORAL
Refills: 0 | Status: ACTIVE | COMMUNITY

## 2025-06-09 RX ORDER — SEMAGLUTIDE 1.34 MG/ML
INJECTION, SOLUTION SUBCUTANEOUS
Refills: 0 | Status: ACTIVE | COMMUNITY

## 2025-06-09 RX ORDER — LOSARTAN POTASSIUM 100 MG/1
TABLET, FILM COATED ORAL
Refills: 0 | Status: ACTIVE | COMMUNITY

## 2025-06-09 RX ORDER — LABETALOL HYDROCHLORIDE 400 MG/1
TABLET, FILM COATED ORAL
Refills: 0 | Status: ACTIVE | COMMUNITY

## 2025-06-09 RX ORDER — INSULIN GLARGINE 100 [IU]/ML
INJECTION, SOLUTION SUBCUTANEOUS
Refills: 0 | Status: ACTIVE | COMMUNITY

## 2025-07-21 ENCOUNTER — APPOINTMENT (OUTPATIENT)
Dept: PULMONOLOGY | Facility: CLINIC | Age: 65
End: 2025-07-21

## (undated) DEVICE — ACMI SELF-SEALING SEAL UP TO 7FR

## (undated) DEVICE — GOWN TRIMAX LG

## (undated) DEVICE — GLV 8 PROTEXIS (WHITE)

## (undated) DEVICE — SOL IRR BAG H2O 3000ML

## (undated) DEVICE — POSITIONER FOAM EGG CRATE ULNAR 2PCS (PINK)

## (undated) DEVICE — POSITIONER FOAM HEADREST (PINK)

## (undated) DEVICE — PACK CYSTO

## (undated) DEVICE — GLV 7 PROTEXIS (WHITE)

## (undated) DEVICE — VENODYNE/SCD SLEEVE CALF LARGE

## (undated) DEVICE — WARMING BLANKET UPPER ADULT

## (undated) DEVICE — FOLEY HOLDER STATLOCK 2 WAY ADULT

## (undated) DEVICE — DRAPE EQUIPMENT BANDED BAG 30 X 30" (SHOWER CAP)

## (undated) DEVICE — SOL IRR POUR H2O 1500ML

## (undated) DEVICE — GLV 7.5 PROTEXIS (WHITE)

## (undated) DEVICE — SOL IRR BAG NS 0.9% 3000ML

## (undated) DEVICE — GLV 6.5 PROTEXIS (WHITE)

## (undated) DEVICE — SUT PDS II 1 48" TP-1

## (undated) DEVICE — ELCTR BOVIE PENCIL SMOKE EVACUATION

## (undated) DEVICE — SOL IRR POUR NS 0.9% 500ML

## (undated) DEVICE — DRAPE DRAINAGE BAG RELAX & GEMINI

## (undated) DEVICE — BOSTON SCIENTIFC PUMPING SYSTEM SAPS SINGLE ACTION 10CC